# Patient Record
Sex: FEMALE | Race: WHITE | Employment: OTHER | URBAN - METROPOLITAN AREA
[De-identification: names, ages, dates, MRNs, and addresses within clinical notes are randomized per-mention and may not be internally consistent; named-entity substitution may affect disease eponyms.]

---

## 2017-05-31 ENCOUNTER — HOSPITAL ENCOUNTER (OUTPATIENT)
Dept: ULTRASOUND IMAGING | Age: 82
Discharge: HOME | End: 2017-05-31
Payer: COMMERCIAL

## 2017-05-31 DIAGNOSIS — N18.30 CKD (CHRONIC KIDNEY DISEASE) STAGE 3, GFR 30-59 ML/MIN (HCC): ICD-10-CM

## 2017-05-31 DIAGNOSIS — I10 ESSENTIAL HYPERTENSION: ICD-10-CM

## 2017-05-31 PROCEDURE — 76770 US EXAM ABDO BACK WALL COMP: CPT

## 2020-02-22 ENCOUNTER — HOSPITAL ENCOUNTER (INPATIENT)
Age: 85
LOS: 1 days | Discharge: HOME | DRG: 392 | End: 2020-02-22
Attending: EMERGENCY MEDICINE | Admitting: INTERNAL MEDICINE
Payer: COMMERCIAL

## 2020-02-22 ENCOUNTER — APPOINTMENT (OUTPATIENT)
Dept: CT IMAGING | Age: 85
DRG: 392 | End: 2020-02-22
Payer: COMMERCIAL

## 2020-02-22 VITALS
SYSTOLIC BLOOD PRESSURE: 127 MMHG | TEMPERATURE: 97.5 F | OXYGEN SATURATION: 94 % | RESPIRATION RATE: 16 BRPM | HEIGHT: 64 IN | HEART RATE: 63 BPM | DIASTOLIC BLOOD PRESSURE: 59 MMHG | WEIGHT: 158.8 LBS | BODY MASS INDEX: 27.11 KG/M2

## 2020-02-22 PROBLEM — R11.2 NAUSEA AND VOMITING IN ADULT: Status: RESOLVED | Noted: 2020-02-22 | Resolved: 2020-02-22

## 2020-02-22 PROBLEM — R11.2 NAUSEA AND VOMITING IN ADULT: Status: ACTIVE | Noted: 2020-02-22

## 2020-02-22 PROBLEM — R10.84 GENERALIZED ABDOMINAL PAIN: Status: RESOLVED | Noted: 2020-02-22 | Resolved: 2020-02-22

## 2020-02-22 PROBLEM — R00.1 BRADYCARDIA: Status: ACTIVE | Noted: 2020-02-22

## 2020-02-22 PROBLEM — R10.84 GENERALIZED ABDOMINAL PAIN: Status: ACTIVE | Noted: 2020-02-22

## 2020-02-22 PROBLEM — R55 SYNCOPE: Status: ACTIVE | Noted: 2020-02-22

## 2020-02-22 PROBLEM — R10.9 ABDOMINAL PAIN: Status: ACTIVE | Noted: 2020-02-22

## 2020-02-22 PROBLEM — R07.9 CHEST PAIN: Status: ACTIVE | Noted: 2020-02-22

## 2020-02-22 PROBLEM — I10 HYPERTENSION: Status: ACTIVE | Noted: 2020-02-22

## 2020-02-22 PROBLEM — I48.91 ATRIAL FIBRILLATION (HCC): Status: ACTIVE | Noted: 2020-02-22

## 2020-02-22 PROBLEM — N18.30 CHRONIC RENAL INSUFFICIENCY, STAGE III (MODERATE) (HCC): Status: ACTIVE | Noted: 2020-02-22

## 2020-02-22 PROBLEM — E78.00 HYPERCHOLESTEROLEMIA: Status: ACTIVE | Noted: 2020-02-22

## 2020-02-22 PROBLEM — R53.83 FATIGUE: Status: ACTIVE | Noted: 2020-02-22

## 2020-02-22 LAB
-: NORMAL
ABSOLUTE EOS #: 0.7 K/UL (ref 0–0.4)
ABSOLUTE IMMATURE GRANULOCYTE: ABNORMAL K/UL (ref 0–0.3)
ABSOLUTE LYMPH #: 2.2 K/UL (ref 1–4.8)
ABSOLUTE MONO #: 1.2 K/UL (ref 0.1–1.2)
ALBUMIN SERPL-MCNC: 3.6 G/DL (ref 3.5–5.2)
ALBUMIN/GLOBULIN RATIO: 1.1 (ref 1–2.5)
ALP BLD-CCNC: 47 U/L (ref 35–104)
ALT SERPL-CCNC: 46 U/L (ref 5–33)
ANION GAP SERPL CALCULATED.3IONS-SCNC: 12 MMOL/L (ref 9–17)
AST SERPL-CCNC: 33 U/L
BASOPHILS # BLD: 0 % (ref 0–2)
BASOPHILS ABSOLUTE: 0.1 K/UL (ref 0–0.2)
BILIRUB SERPL-MCNC: 0.2 MG/DL (ref 0.3–1.2)
BILIRUBIN URINE: NEGATIVE
BUN BLDV-MCNC: 54 MG/DL (ref 8–23)
BUN/CREAT BLD: ABNORMAL (ref 9–20)
CALCIUM SERPL-MCNC: 8.6 MG/DL (ref 8.6–10.4)
CHLORIDE BLD-SCNC: 105 MMOL/L (ref 98–107)
CO2: 24 MMOL/L (ref 20–31)
COLOR: YELLOW
COMMENT UA: NORMAL
CREAT SERPL-MCNC: 1.24 MG/DL (ref 0.5–0.9)
DIFFERENTIAL TYPE: ABNORMAL
EOSINOPHILS RELATIVE PERCENT: 4 % (ref 1–4)
GFR AFRICAN AMERICAN: 49 ML/MIN
GFR NON-AFRICAN AMERICAN: 41 ML/MIN
GFR SERPL CREATININE-BSD FRML MDRD: ABNORMAL ML/MIN/{1.73_M2}
GFR SERPL CREATININE-BSD FRML MDRD: ABNORMAL ML/MIN/{1.73_M2}
GLUCOSE BLD-MCNC: 110 MG/DL (ref 70–99)
GLUCOSE URINE: NEGATIVE
HCT VFR BLD CALC: 45.6 % (ref 36–46)
HEMOGLOBIN: 14.9 G/DL (ref 12–16)
IMMATURE GRANULOCYTES: ABNORMAL %
INR BLD: 1.3
KETONES, URINE: NEGATIVE
LACTIC ACID: 1.2 MMOL/L (ref 0.5–2.2)
LEUKOCYTE ESTERASE, URINE: NEGATIVE
LIPASE: 51 U/L (ref 13–60)
LYMPHOCYTES # BLD: 12 % (ref 24–44)
MAGNESIUM: 2.3 MG/DL (ref 1.6–2.6)
MCH RBC QN AUTO: 31 PG (ref 26–34)
MCHC RBC AUTO-ENTMCNC: 32.7 G/DL (ref 31–37)
MCV RBC AUTO: 94.7 FL (ref 80–100)
MONOCYTES # BLD: 7 % (ref 2–11)
NITRITE, URINE: NEGATIVE
NRBC AUTOMATED: ABNORMAL PER 100 WBC
PARTIAL THROMBOPLASTIN TIME: 29.9 SEC (ref 21.3–31.3)
PDW BLD-RTO: 14.3 % (ref 12.5–15.4)
PH UA: 6 (ref 5–8)
PLATELET # BLD: 234 K/UL (ref 140–450)
PLATELET ESTIMATE: ABNORMAL
PMV BLD AUTO: 8.2 FL (ref 6–12)
POTASSIUM SERPL-SCNC: 4.2 MMOL/L (ref 3.7–5.3)
PROTEIN UA: NEGATIVE
PROTHROMBIN TIME: 13.8 SEC (ref 9.4–12.6)
RBC # BLD: 4.82 M/UL (ref 4–5.2)
RBC # BLD: ABNORMAL 10*6/UL
REASON FOR REJECTION: NORMAL
SEG NEUTROPHILS: 77 % (ref 36–66)
SEGMENTED NEUTROPHILS ABSOLUTE COUNT: 14 K/UL (ref 1.8–7.7)
SODIUM BLD-SCNC: 141 MMOL/L (ref 135–144)
SPECIFIC GRAVITY UA: 1.01 (ref 1–1.03)
TOTAL PROTEIN: 7 G/DL (ref 6.4–8.3)
TROPONIN INTERP: NORMAL
TROPONIN INTERP: NORMAL
TROPONIN T: NORMAL NG/ML
TROPONIN T: NORMAL NG/ML
TROPONIN, HIGH SENSITIVITY: 8 NG/L (ref 0–14)
TROPONIN, HIGH SENSITIVITY: 9 NG/L (ref 0–14)
TURBIDITY: CLEAR
URINE HGB: NEGATIVE
UROBILINOGEN, URINE: NORMAL
WBC # BLD: 18 K/UL (ref 3.5–11)
WBC # BLD: ABNORMAL 10*3/UL
ZZ NTE CLEAN UP: ORDERED TEST: NORMAL
ZZ NTE WITH NAME CLEAN UP: SPECIMEN SOURCE: NORMAL

## 2020-02-22 PROCEDURE — 96374 THER/PROPH/DIAG INJ IV PUSH: CPT

## 2020-02-22 PROCEDURE — 6370000000 HC RX 637 (ALT 250 FOR IP): Performed by: NURSE PRACTITIONER

## 2020-02-22 PROCEDURE — 2500000003 HC RX 250 WO HCPCS: Performed by: EMERGENCY MEDICINE

## 2020-02-22 PROCEDURE — 83690 ASSAY OF LIPASE: CPT

## 2020-02-22 PROCEDURE — 2580000003 HC RX 258: Performed by: EMERGENCY MEDICINE

## 2020-02-22 PROCEDURE — 83735 ASSAY OF MAGNESIUM: CPT

## 2020-02-22 PROCEDURE — 1210000000 HC MED SURG R&B

## 2020-02-22 PROCEDURE — 81003 URINALYSIS AUTO W/O SCOPE: CPT

## 2020-02-22 PROCEDURE — 99285 EMERGENCY DEPT VISIT HI MDM: CPT

## 2020-02-22 PROCEDURE — 93005 ELECTROCARDIOGRAM TRACING: CPT | Performed by: EMERGENCY MEDICINE

## 2020-02-22 PROCEDURE — 85610 PROTHROMBIN TIME: CPT

## 2020-02-22 PROCEDURE — 85730 THROMBOPLASTIN TIME PARTIAL: CPT

## 2020-02-22 PROCEDURE — 2580000003 HC RX 258: Performed by: NURSE PRACTITIONER

## 2020-02-22 PROCEDURE — 85025 COMPLETE CBC W/AUTO DIFF WBC: CPT

## 2020-02-22 PROCEDURE — APPSS45 APP SPLIT SHARED TIME 31-45 MINUTES: Performed by: NURSE PRACTITIONER

## 2020-02-22 PROCEDURE — 84484 ASSAY OF TROPONIN QUANT: CPT

## 2020-02-22 PROCEDURE — 80053 COMPREHEN METABOLIC PANEL: CPT

## 2020-02-22 PROCEDURE — 83605 ASSAY OF LACTIC ACID: CPT

## 2020-02-22 PROCEDURE — 36415 COLL VENOUS BLD VENIPUNCTURE: CPT

## 2020-02-22 PROCEDURE — 74176 CT ABD & PELVIS W/O CONTRAST: CPT

## 2020-02-22 PROCEDURE — 99222 1ST HOSP IP/OBS MODERATE 55: CPT | Performed by: INTERNAL MEDICINE

## 2020-02-22 RX ORDER — METOPROLOL SUCCINATE 25 MG/1
25 TABLET, EXTENDED RELEASE ORAL DAILY
Qty: 30 TABLET | Refills: 0 | Status: SHIPPED | OUTPATIENT
Start: 2020-02-22 | End: 2020-03-23

## 2020-02-22 RX ORDER — VITAMIN B COMPLEX
1 CAPSULE ORAL DAILY
Status: ON HOLD | COMMUNITY
End: 2020-02-22 | Stop reason: HOSPADM

## 2020-02-22 RX ORDER — FAMOTIDINE 20 MG/1
40 TABLET, FILM COATED ORAL DAILY
Status: DISCONTINUED | OUTPATIENT
Start: 2020-02-22 | End: 2020-02-22 | Stop reason: HOSPADM

## 2020-02-22 RX ORDER — RANITIDINE 150 MG/1
150 TABLET ORAL 2 TIMES DAILY
COMMUNITY

## 2020-02-22 RX ORDER — LISINOPRIL AND HYDROCHLOROTHIAZIDE 25; 20 MG/1; MG/1
1 TABLET ORAL DAILY
Status: ON HOLD | COMMUNITY
End: 2020-02-22 | Stop reason: HOSPADM

## 2020-02-22 RX ORDER — ACETAMINOPHEN 650 MG/1
650 SUPPOSITORY RECTAL EVERY 6 HOURS PRN
Status: DISCONTINUED | OUTPATIENT
Start: 2020-02-22 | End: 2020-02-22 | Stop reason: HOSPADM

## 2020-02-22 RX ORDER — AMLODIPINE BESYLATE 5 MG/1
5 TABLET ORAL DAILY
Status: DISCONTINUED | OUTPATIENT
Start: 2020-02-22 | End: 2020-02-22

## 2020-02-22 RX ORDER — SODIUM CHLORIDE 9 MG/ML
INJECTION, SOLUTION INTRAVENOUS CONTINUOUS
Status: DISCONTINUED | OUTPATIENT
Start: 2020-02-22 | End: 2020-02-22

## 2020-02-22 RX ORDER — SODIUM CHLORIDE 0.9 % (FLUSH) 0.9 %
10 SYRINGE (ML) INJECTION EVERY 12 HOURS SCHEDULED
Status: DISCONTINUED | OUTPATIENT
Start: 2020-02-22 | End: 2020-02-22 | Stop reason: HOSPADM

## 2020-02-22 RX ORDER — FLUTICASONE PROPIONATE 50 MCG
1 SPRAY, SUSPENSION (ML) NASAL DAILY
COMMUNITY

## 2020-02-22 RX ORDER — FAMOTIDINE 10 MG
10 TABLET ORAL 2 TIMES DAILY PRN
COMMUNITY

## 2020-02-22 RX ORDER — METOPROLOL SUCCINATE 25 MG/1
25 TABLET, EXTENDED RELEASE ORAL DAILY
Status: DISCONTINUED | OUTPATIENT
Start: 2020-02-23 | End: 2020-02-22 | Stop reason: HOSPADM

## 2020-02-22 RX ORDER — FLUTICASONE PROPIONATE 50 MCG
1 SPRAY, SUSPENSION (ML) NASAL DAILY
Status: DISCONTINUED | OUTPATIENT
Start: 2020-02-22 | End: 2020-02-22 | Stop reason: HOSPADM

## 2020-02-22 RX ORDER — POTASSIUM CHLORIDE 20 MEQ/1
40 TABLET, EXTENDED RELEASE ORAL PRN
Status: DISCONTINUED | OUTPATIENT
Start: 2020-02-22 | End: 2020-02-22 | Stop reason: HOSPADM

## 2020-02-22 RX ORDER — ASPIRIN 81 MG/1
81 TABLET ORAL DAILY
Status: DISCONTINUED | OUTPATIENT
Start: 2020-02-22 | End: 2020-02-22 | Stop reason: HOSPADM

## 2020-02-22 RX ORDER — PROMETHAZINE HYDROCHLORIDE 25 MG/1
12.5 TABLET ORAL EVERY 6 HOURS PRN
Status: DISCONTINUED | OUTPATIENT
Start: 2020-02-22 | End: 2020-02-22 | Stop reason: HOSPADM

## 2020-02-22 RX ORDER — MORPHINE SULFATE 2 MG/ML
2 INJECTION, SOLUTION INTRAMUSCULAR; INTRAVENOUS
Status: DISCONTINUED | OUTPATIENT
Start: 2020-02-22 | End: 2020-02-22 | Stop reason: HOSPADM

## 2020-02-22 RX ORDER — ROSUVASTATIN CALCIUM 10 MG/1
10 TABLET, COATED ORAL NIGHTLY
Status: DISCONTINUED | OUTPATIENT
Start: 2020-02-22 | End: 2020-02-22 | Stop reason: HOSPADM

## 2020-02-22 RX ORDER — DEXTROSE, SODIUM CHLORIDE, AND POTASSIUM CHLORIDE 5; .45; .15 G/100ML; G/100ML; G/100ML
INJECTION INTRAVENOUS CONTINUOUS
Status: DISCONTINUED | OUTPATIENT
Start: 2020-02-22 | End: 2020-02-22

## 2020-02-22 RX ORDER — SODIUM CHLORIDE 0.9 % (FLUSH) 0.9 %
10 SYRINGE (ML) INJECTION PRN
Status: DISCONTINUED | OUTPATIENT
Start: 2020-02-22 | End: 2020-02-22 | Stop reason: HOSPADM

## 2020-02-22 RX ORDER — POTASSIUM CHLORIDE 7.45 MG/ML
10 INJECTION INTRAVENOUS PRN
Status: DISCONTINUED | OUTPATIENT
Start: 2020-02-22 | End: 2020-02-22 | Stop reason: HOSPADM

## 2020-02-22 RX ORDER — ACETAMINOPHEN 325 MG/1
650 TABLET ORAL EVERY 6 HOURS PRN
Status: DISCONTINUED | OUTPATIENT
Start: 2020-02-22 | End: 2020-02-22 | Stop reason: HOSPADM

## 2020-02-22 RX ORDER — METOPROLOL TARTRATE 5 MG/5ML
5 INJECTION INTRAVENOUS EVERY 4 HOURS PRN
Status: DISCONTINUED | OUTPATIENT
Start: 2020-02-22 | End: 2020-02-22 | Stop reason: HOSPADM

## 2020-02-22 RX ORDER — 0.9 % SODIUM CHLORIDE 0.9 %
500 INTRAVENOUS SOLUTION INTRAVENOUS ONCE
Status: COMPLETED | OUTPATIENT
Start: 2020-02-22 | End: 2020-02-22

## 2020-02-22 RX ORDER — METOPROLOL SUCCINATE 25 MG/1
25 TABLET, EXTENDED RELEASE ORAL 2 TIMES DAILY
Status: DISCONTINUED | OUTPATIENT
Start: 2020-02-22 | End: 2020-02-22

## 2020-02-22 RX ORDER — MORPHINE SULFATE 4 MG/ML
4 INJECTION, SOLUTION INTRAMUSCULAR; INTRAVENOUS
Status: DISCONTINUED | OUTPATIENT
Start: 2020-02-22 | End: 2020-02-22 | Stop reason: HOSPADM

## 2020-02-22 RX ORDER — LISINOPRIL 20 MG/1
20 TABLET ORAL DAILY
Qty: 30 TABLET | Refills: 0 | Status: SHIPPED | OUTPATIENT
Start: 2020-02-22 | End: 2020-03-23

## 2020-02-22 RX ORDER — MONTELUKAST SODIUM 10 MG/1
10 TABLET ORAL NIGHTLY
Status: DISCONTINUED | OUTPATIENT
Start: 2020-02-22 | End: 2020-02-22 | Stop reason: HOSPADM

## 2020-02-22 RX ORDER — ROSUVASTATIN CALCIUM 10 MG/1
10 TABLET, COATED ORAL DAILY
Status: DISCONTINUED | OUTPATIENT
Start: 2020-02-22 | End: 2020-02-22

## 2020-02-22 RX ORDER — GREEN TEA/HOODIA GORDONII 315-12.5MG
CAPSULE ORAL
COMMUNITY

## 2020-02-22 RX ORDER — LEVOTHYROXINE SODIUM 0.07 MG/1
75 TABLET ORAL DAILY
Status: DISCONTINUED | OUTPATIENT
Start: 2020-02-22 | End: 2020-02-22 | Stop reason: HOSPADM

## 2020-02-22 RX ORDER — NICOTINE 21 MG/24HR
1 PATCH, TRANSDERMAL 24 HOURS TRANSDERMAL DAILY PRN
Status: DISCONTINUED | OUTPATIENT
Start: 2020-02-22 | End: 2020-02-22 | Stop reason: HOSPADM

## 2020-02-22 RX ORDER — VITAMIN B COMPLEX
1 CAPSULE ORAL DAILY
Status: DISCONTINUED | OUTPATIENT
Start: 2020-02-22 | End: 2020-02-22

## 2020-02-22 RX ORDER — VITAMIN B COMPLEX
2000 TABLET ORAL 2 TIMES DAILY
Status: DISCONTINUED | OUTPATIENT
Start: 2020-02-22 | End: 2020-02-22 | Stop reason: HOSPADM

## 2020-02-22 RX ORDER — ONDANSETRON 2 MG/ML
4 INJECTION INTRAMUSCULAR; INTRAVENOUS EVERY 6 HOURS PRN
Status: DISCONTINUED | OUTPATIENT
Start: 2020-02-22 | End: 2020-02-22 | Stop reason: HOSPADM

## 2020-02-22 RX ORDER — MAGNESIUM SULFATE 1 G/100ML
1 INJECTION INTRAVENOUS PRN
Status: DISCONTINUED | OUTPATIENT
Start: 2020-02-22 | End: 2020-02-22 | Stop reason: HOSPADM

## 2020-02-22 RX ORDER — LISINOPRIL AND HYDROCHLOROTHIAZIDE 12.5; 1 MG/1; MG/1
2 TABLET ORAL DAILY
Status: DISCONTINUED | OUTPATIENT
Start: 2020-02-22 | End: 2020-02-22 | Stop reason: HOSPADM

## 2020-02-22 RX ORDER — ACETAMINOPHEN 325 MG/1
650 TABLET ORAL EVERY 6 HOURS PRN
Status: ON HOLD | COMMUNITY
End: 2020-02-22 | Stop reason: HOSPADM

## 2020-02-22 RX ORDER — POLYETHYLENE GLYCOL 3350 17 G/17G
17 POWDER, FOR SOLUTION ORAL DAILY PRN
Status: DISCONTINUED | OUTPATIENT
Start: 2020-02-22 | End: 2020-02-22 | Stop reason: HOSPADM

## 2020-02-22 RX ADMIN — ASPIRIN 81 MG: 81 TABLET, COATED ORAL at 13:03

## 2020-02-22 RX ADMIN — SODIUM CHLORIDE 500 ML: 9 INJECTION, SOLUTION INTRAVENOUS at 02:45

## 2020-02-22 RX ADMIN — FAMOTIDINE 20 MG: 10 INJECTION, SOLUTION INTRAVENOUS at 03:27

## 2020-02-22 RX ADMIN — VITAMIN D, TAB 1000IU (100/BT) 2000 UNITS: 25 TAB at 13:02

## 2020-02-22 RX ADMIN — SODIUM CHLORIDE: 9 INJECTION, SOLUTION INTRAVENOUS at 10:00

## 2020-02-22 RX ADMIN — Medication 10 ML: at 12:56

## 2020-02-22 ASSESSMENT — ENCOUNTER SYMPTOMS
NAUSEA: 1
EYE DISCHARGE: 1
SORE THROAT: 0
SHORTNESS OF BREATH: 0
ABDOMINAL PAIN: 1
BACK PAIN: 0
DIARRHEA: 0
ALLERGIC/IMMUNOLOGIC COMMENTS: MULTIPLE ALLERGIES
RESPIRATORY NEGATIVE: 1
BLOOD IN STOOL: 0
COUGH: 0
ABDOMINAL DISTENTION: 0
VOMITING: 1
EYE ITCHING: 0
EYE REDNESS: 0
RHINORRHEA: 0
EYE PAIN: 0

## 2020-02-22 ASSESSMENT — PAIN SCALES - GENERAL
PAINLEVEL_OUTOF10: 0

## 2020-02-22 NOTE — ED NOTES
This writer called inpatient unit to inquire regarding transfer, Mariangel Garvin to transfer Pt to room 327, awaiting arrival of [facility].      Stanly Goltz, RN  02/22/20 6264

## 2020-02-22 NOTE — ED PROVIDER NOTES
[address] [facility]  [address] [address] [facility] [address]  Phone: [phone]  Fax: [phone] [facility]          Pt Name: Iron Coleman  MRN: [id number]  [place] 1/16/1932  Date of evaluation: 2/22/2020      CHIEF COMPLAINT       Chief Complaint   Patient presents with    Nausea    Emesis       HISTORY OF PRESENT ILLNESS       Iron Coleman is a 80 y.o. female who presents with acute nausea and vomiting with mild abdominal discomfort. Awoke her from sleeping tonight. She lives alone and felt too weak to get out of the bathroom. No diarrhea. Denies any fevers. Went to bed feeling fine around 10 PM.  Symptoms woke her up. Denies any chest pain or difficulty breathing. EMS gave 4 of Zofran and started IV fluids. She states she is feeling a little bit better after the medications. No abdominal surgical history per the patient. Denies any urinary symptoms. Denies other symptoms at this time. REVIEW OF SYSTEMS       Review of Systems   Constitutional: Negative for chills, fatigue and fever. HENT: Negative for rhinorrhea and sore throat. Eyes: Negative for pain. Respiratory: Negative for cough and shortness of breath. Cardiovascular: Negative for chest pain. Gastrointestinal: Positive for nausea and vomiting. Negative for abdominal distention, blood in stool and diarrhea. Genitourinary: Negative for difficulty urinating. Musculoskeletal: Negative for back pain and neck pain. Skin: Negative for rash. Neurological: Negative for weakness and headaches. 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     SURGICAL HISTORY      has a past surgical history that includes Carotid endarterectomy (Right, 2004 or 2005); eye surgery (Bilateral); and pre-malignant / benign skin lesion excision (3/26/2013). CURRENT MEDICATIONS       Previous Medications    ACETAMINOPHEN (TYLENOL) 325 MG TABLET    Take 650 mg by mouth every 6 hours as needed for Pain    AMLODIPINE (NORVASC) 5 MG TABLET    Take 0.5 tablets by mouth daily    ASPIRIN 81 MG EC TABLET    Take 81 mg by mouth daily    B COMPLEX VITAMINS CAPSULE    Take 1 capsule by mouth daily    CHOLECALCIFEROL (VITAMIN D3) 3000 UNITS TABS    Take 2,000 Units by mouth 2 times daily    CRESTOR 10 MG TABLET    Take 10 mg by mouth daily. FLUTICASONE (FLONASE) 50 MCG/ACT NASAL SPRAY    1 spray by Each Nostril route daily    LEVOTHYROXINE (SYNTHROID) 75 MCG TABLET    Take 75 mcg by mouth Daily. LISINOPRIL-HYDROCHLOROTHIAZIDE (ZESTORETIC) 20-25 MG PER TABLET    Take 1 tablet by mouth daily    METOPROLOL (TOPROL-XL) 25 MG XL TABLET    Take 25 mg by mouth 2 times daily     MONTELUKAST (SINGULAIR) 10 MG TABLET    Take 10 mg by mouth nightly    MULTIPLE VITAMINS-MINERALS (HAIR/SKIN/NAILS/BIOTIN) TABS    Take by mouth    RANITIDINE (ZANTAC) 150 MG TABLET    Take 150 mg by mouth 2 times daily       ALLERGIES     is allergic to other; pcn [penicillins]; pollen extract; seasonal; and lidocaine. FAMILY HISTORY     She indicated that her mother is . She indicated that her father is . She indicated that only one of her four sisters is alive. She indicated that all of her three brothers are . family history includes Cancer in her sister; Cirrhosis in her mother; Heart Disease in her brother, brother, brother, father, sister, and sister; Hypertension in her mother. SOCIAL HISTORY      reports that she quit smoking about 39 years ago. Her smoking use included cigarettes. She has a 1.00 pack-year smoking history. She has never used smokeless tobacco. She reports that she does not drink alcohol or use drugs.     PHYSICAL EXAM     INITIAL VITALS:  height is 5' 4\" (1.626 m) and The patient was given the following medications:  Orders Placed This Encounter   Medications    0.9 % sodium chloride bolus    famotidine (PEPCID) injection 20 mg        Vitals:    Vitals:    02/22/20 0253   BP: (!) 169/72   Pulse: 76   Resp: 18   Temp: 98.4 °F (36.9 °C)   TempSrc: Oral   SpO2: 95%   Weight: 67.6 kg (149 lb)   Height: 5' 4\" (1.626 m)     -------------------------  BP: (!) 169/72, Temp: 98.4 °F (36.9 °C), Pulse: 76, Resp: 18      Re-evaluation Notes    Patient doing better on reevaluation's. No acute changes. Recheck abdomen is benign and nonsurgical at this time. Cardiac enzymes normal.  Still throwing intermittent PVCs. Lactic acid negative. Does have significant leukocytosis. CT scan shows no acute significant findings. Plan will be to admit the patient for further observation. I discussed this with the patient and she is agreeable. Also discussed with the hospitalist who accepted admission of the patient. May be viral.  Patient also mentions that she just finished a course of prednisone for gout which could also account for some of her leukocytosis however still may be infectious in nature. CONSULTS:    None    CRITICAL CARE:     None    PROCEDURES:    None    FINAL IMPRESSION      1. Non-intractable vomiting with nausea, unspecified vomiting type    2. Generalized abdominal pain          DISPOSITION/PLAN   DISPOSITION Decision To Admit 02/22/2020 06:16:44 AM      Condition on Disposition    Improved    PATIENT REFERRED TO:  No follow-up provider specified.     DISCHARGE MEDICATIONS:  New Prescriptions    No medications on file       (Please note that portions of this note were completed with a voice recognition program.  Efforts were made to edit the dictations but occasionally words are mis-transcribed.)    Zach Prajapati DO  Attending Emergency Physician        Zach Prajapati DO  02/22/20 9210

## 2020-02-22 NOTE — DISCHARGE SUMMARY
[address] [facility]    Discharge Summary     Patient ID: Miladis Welsh  :  1932   MRN: [id number]     ACCOUNT:  [de-identified]   Patient's PCP: Brittny Jimenez  Admit Date: 2020   Discharge Date: 2020     Length of Stay: 0  Code Status:  Full Code  Admitting Physician: Marcy Ashley, DO  Discharge Physician: RUBEN Hampton NP     Active Discharge Diagnoses:     Hospital Problem Lists:  Principal Problem (Resolved):    Nausea and vomiting in adult  Active Problems:    Chronic renal insufficiency, stage III (moderate) ([facility] [address]    Hypertension  Resolved Problems:    Generalized abdominal pain      Admission Condition:  good     Discharged Condition: good    Hospital Stay:     Hospital Course:  Miladis Welsh is a 80 y.o. female who was admitted for the management of  Nausea and vomiting in adult , presented to ER with Nausea and Emesis    She woke up at 2 AM this morning with sudden nausea and vomiting. She also had an episode of loose stool, then the nausea continued. She came to the ED for evaluation. CT scan of the abdomen was negative, WBC elevated. The patient reported she had just completed a course of steroid for a \"gout attack\" a few days prior to this event. She was given IV fluids and antiemetics, and her symptoms resolved quickly. Her ALT and AST were noted to be slightly elevated. She denied abdominal pain throughout the day. Her creatinine remained at baseline.          Significant therapeutic interventions:     IV hydration  Antiemetic      Significant Diagnostic Studies:   Labs / Micro:  CBC:   Lab Results   Component Value Date    WBC 18.0 2020    RBC 4.82 2020    HGB 14.9 2020    HCT 45.6 2020    MCV 94.7 2020    MCH 31.0 2020    MCHC 32.7 2020    RDW 14.3 2020     2020       HFP:    Lab Results   Component Value Date    PROT 7.0 2020     CMP:    Lab Results Component Value Date    GLUCOSE 110 02/22/2020     02/22/2020    K 4.2 02/22/2020     02/22/2020    CO2 24 02/22/2020    BUN 54 02/22/2020    CREATININE 1.24 02/22/2020    ANIONGAP 12 02/22/2020    ALKPHOS 47 02/22/2020    ALT 46 02/22/2020    AST 33 02/22/2020    BILITOT 0.20 02/22/2020    LABALBU 3.6 02/22/2020    ALBUMIN 1.1 02/22/2020    LABGLOM 41 02/22/2020    GFRAA 49 02/22/2020    GFR      02/22/2020    GFR NOT REPORTED 02/22/2020    PROT 7.0 02/22/2020    CALCIUM 8.6 02/22/2020     U/A:    Lab Results   Component Value Date    COLORU YELLOW 02/22/2020    TURBIDITY CLEAR 02/22/2020    SPECGRAV 1.015 02/22/2020    HGBUR NEGATIVE 02/22/2020    PHUR 6.0 02/22/2020    PROTEINU NEGATIVE 02/22/2020    GLUCOSEU NEGATIVE 02/22/2020    KETUA NEGATIVE 02/22/2020    BILIRUBINUR NEGATIVE 02/22/2020    UROBILINOGEN Normal 02/22/2020    NITRU NEGATIVE 02/22/2020    LEUKOCYTESUR NEGATIVE 02/22/2020       Radiology:  Ct Abdomen Pelvis Wo Contrast Additional Contrast? None    Result Date: 2/22/2020  No acute findings. Consultations:    Consults:     Final Specialist Recommendations/Findings:   None      The patient was seen and examined on day of discharge and this discharge summary is in conjunction with any daily progress note from day of discharge.     Discharge plan:     Disposition: Home    Physician Follow Up:     [facility]  [address] [facility]  [phone]    In 1 week  Call for a post hospitalization follow up early next week       Requiring Further Evaluation/Follow Up POST HOSPITALIZATION/Incidental Findings:   ALT/AST elevated    Monitor CBC/diff    Allergy specialist recommendations (patient has an appointment to see an allergist soon)    Diet: cardiac diet    Activity: As tolerated    Instructions to Patient:     Take medications as prescribed    Follow up with your PCP as recommended    Follow up with nephrology & allergist    Discharge Medications:      Medication List patient's care.

## 2020-02-22 NOTE — H&P
[address] [facility]    HISTORY AND PHYSICAL EXAMINATION            Date:   2/22/2020  Patient name:  Christina Park  Date of admission:  2/22/2020  2:54 AM  MRN:   [id number]  Account:  [de-identified]  YOB: 1932  PCP:    David Medina  Room:   327/327-01  Code Status:    Full Code    Chief Complaint:     Chief Complaint   Patient presents with    Nausea    Emesis       History Obtained From:     patient, electronic medical record    History of Present Illness:     Christina Park is a 80 y.o. Non-/non  female who presents with Nausea and Emesis   and is admitted to the hospital for the management of Nausea and vomiting in adult. Patient reports last night she had sudden onset of generalized abdominal pain with nausea. She got up to go to the bathroom, had 3-4 bouts of emesis, then had a loose bowel movement. She then had continued nausea. She has many food allergies and a history of asthma. She had just completed a round of prednisone for a \"gout attack\". She was brought to the ED by EMS after activating her Life Alert. EMS gave her IV fluids and Zofran. While in the ED, EKG was completed and revealed sinus rhythm w PVCs and no signs of MI or ischemia. CT of the abdomen was completed and was negative. Her creat is close to her baseline- she has CKD and sees Dr Janet Goncalves as an outpatient. Her WBC was 18 and her eosinophils were noted to 0.70. She does not recall eating anything she's allergic/sensitive to. She was admitted for further observation and management. Today she is feeling much better. She has had no further nausea or vomiting since admission, and her diet has been advanced. Her BP is borderline today, though, and her BP meds have been held throughout the shift. She is able to ambulate well with a walker, and she says she uses both a walker and a cane in her home.  She is tolerating the advancement of her diet well oz (72 kg)   SpO2 94%   BMI 27.26 kg/m²   Temp (24hrs), Av.9 °F (36.6 °C), Min:97.5 °F (36.4 °C), Max:98.4 °F (36.9 °C)        Intake/Output Summary (Last 24 hours) at 2020 1532  Last data filed at 2020 1235  Gross per 24 hour   Intake 468 ml   Output --   Net 468 ml       Physical Exam  Constitutional:       General: She is not in acute distress. Appearance: Normal appearance. She is not ill-appearing. HENT:      Head: Normocephalic and atraumatic. Right Ear: External ear normal.      Left Ear: External ear normal.      Nose: Nose normal. No congestion or rhinorrhea. Mouth/Throat:      Mouth: Mucous membranes are moist.   Eyes:      General: No scleral icterus. Right eye: No discharge. Left eye: No discharge. Extraocular Movements: Extraocular movements intact. Conjunctiva/sclera: Conjunctivae normal.      Pupils: Pupils are equal, round, and reactive to light. Neck:      Musculoskeletal: Normal range of motion and neck supple. Comments: No JVD  Cardiovascular:      Rate and Rhythm: Normal rate. Rhythm irregular. Pulses: Normal pulses. Heart sounds: Normal heart sounds. No murmur. No friction rub. No gallop. Pulmonary:      Effort: Pulmonary effort is normal. No respiratory distress. Breath sounds: Normal breath sounds. Abdominal:      General: There is no distension. Palpations: Abdomen is soft. There is no mass. Tenderness: There is no abdominal tenderness. There is no guarding. Comments: Bowel sounds hypoactive   Musculoskeletal: Normal range of motion. Right lower leg: No edema. Left lower leg: No edema. Skin:     General: Skin is warm and dry. Coloration: Skin is pale. Findings: No bruising. Neurological:      General: No focal deficit present. Mental Status: She is alert and oriented to person, place, and time.    Psychiatric:         Mood and Affect: Mood normal.         Behavior: 33 (H) <32 U/L    Total Bilirubin 0.20 (L) 0.3 - 1.2 mg/dL    Total Protein 7.0 6.4 - 8.3 g/dL    Alb 3.6 3.5 - 5.2 g/dL    Albumin/Globulin Ratio 1.1 1.0 - 2.5    GFR Non- 41 (L) >60 mL/min    GFR  49 (L) >60 mL/min    GFR Comment          GFR Staging NOT REPORTED    Troponin    Collection Time: 02/22/20  3:05 AM   Result Value Ref Range    Troponin, High Sensitivity 9 0 - 14 ng/L    Troponin T NOT REPORTED <0.03 ng/mL    Troponin Interp NOT REPORTED    Lactic Acid    Collection Time: 02/22/20  3:05 AM   Result Value Ref Range    Lactic Acid 1.2 0.5 - 2.2 mmol/L   Lipase    Collection Time: 02/22/20  3:05 AM   Result Value Ref Range    Lipase 51 13 - 60 U/L   Magnesium    Collection Time: 02/22/20  3:05 AM   Result Value Ref Range    Magnesium 2.3 1.6 - 2.6 mg/dL   SPECIMEN REJECTION    Collection Time: 02/22/20  3:05 AM   Result Value Ref Range    Specimen Source . BLOOD     Ordered Test PT PTT     Reason for Rejection Unable to perform testing: No specimen received.      - NOT REPORTED    Protime-INR    Collection Time: 02/22/20  3:24 AM   Result Value Ref Range    Protime 13.8 (H) 9.4 - 12.6 sec    INR 1.3    APTT    Collection Time: 02/22/20  3:24 AM   Result Value Ref Range    PTT 29.9 21.3 - 31.3 sec   Troponin    Collection Time: 02/22/20  5:06 AM   Result Value Ref Range    Troponin, High Sensitivity 8 0 - 14 ng/L    Troponin T NOT REPORTED <0.03 ng/mL    Troponin Interp NOT REPORTED    Urinalysis Reflex to Culture    Collection Time: 02/22/20  6:05 AM   Result Value Ref Range    Color, UA YELLOW YELLOW    Turbidity UA CLEAR CLEAR    Glucose, Ur NEGATIVE NEGATIVE    Bilirubin Urine NEGATIVE NEGATIVE    Ketones, Urine NEGATIVE NEGATIVE    Specific Gravity, UA 1.015 1.005 - 1.030    Urine Hgb NEGATIVE NEGATIVE    pH, UA 6.0 5.0 - 8.0    Protein, UA NEGATIVE NEGATIVE    Urobilinogen, Urine Normal Normal    Nitrite, Urine NEGATIVE NEGATIVE    Leukocyte Esterase, Urine NEGATIVE NEGATIVE    Urinalysis Comments       Microscopic exam not performed based on chemical results unless requested in original order. Urinalysis Comments          Urinalysis Comments       Utilizing a urinalysis as the only screening method to exclude a potential uropathogen can be unreliable in many patient populations. Rapid screening tests are less sensitive than culture and if UTI is a clinical possibility, culture should be considered despite a negative urinalysis. Imaging/Diagnostics:    Ct Abdomen Pelvis Wo Contrast Additional Contrast? None    Result Date: 2/22/2020  No acute findings. Assessment :      Hospital Problems           Last Modified POA    * (Principal) Nausea and vomiting in adult 2/22/2020 Yes    Chronic renal insufficiency, stage III (moderate) ([facility] [address] 2/22/2020 Yes    Hypertension 2/22/2020 Yes    Generalized abdominal pain 2/22/2020 Yes          Plan:     Patient status inpatient in the Med/Surge    1. Monitor for further nausea/vomiting- Zofran prn  2. Advance diet as tolerated- stop IVF when taking PO well  3. Creat at baseline- avoid nephrotoxic agents  4. Monitor BP- hold HCTZ/lisinopril. Consider changing toprol to daily, discontinue or decrease Norvasc  5. Follow up with Dr Griffin Denney as outpatient  6. Abdominal pain resolved- cont to monitor  7. GI prophylaxis  8. No Lovenox 2/2 renal function  9. Ambulate  10. Send stool for culture    Anticipate discharge home this evening if medically stable with follow up with PCP early next week. Consultations:   None     Patient is admitted as inpatient status because of co-morbidities listed above, severity of signs and symptoms as outlined, requirement for current medical therapies and most importantly because of direct risk to patient if care not provided in a hospital setting.     RUBEN Ortiz NP  2/22/2020  3:32 PM    Copy sent to Dr. Shayy Bobo

## 2020-02-23 LAB
EKG ATRIAL RATE: 73 BPM
EKG P AXIS: 89 DEGREES
EKG P-R INTERVAL: 222 MS
EKG Q-T INTERVAL: 408 MS
EKG QRS DURATION: 78 MS
EKG QTC CALCULATION (BAZETT): 449 MS
EKG T AXIS: 26 DEGREES
EKG VENTRICULAR RATE: 73 BPM

## 2020-02-23 NOTE — PLAN OF CARE
Problem: Falls - Risk of:  Goal: Will remain free from falls  Description  Will remain free from falls  Outcome: Ongoing  Patient is a fall risk during this admission. Fall risk assessment was performed. Patient is absent of falls. Bed is in the lowest position. Wheels on the bed are locked. Call light and bed side table are within reach. Clutter is removed. Patient was educated to call out when needing assistance or wanting to get out of bed. Patient offered toileting assistance during rounding. Hourly rounds have been performed. Goal: Absence of physical injury  Description  Absence of physical injury  Outcome: Ongoing     Problem: Risk for Impaired Skin Integrity  Goal: Tissue integrity - skin and mucous membranes  Description  Structural intactness and normal physiological function of skin and  mucous membranes.   Outcome: Ongoing     Problem: Infection:  Goal: Will remain free from infection  Description  Will remain free from infection  Outcome: Ongoing  No signs of infection      Problem: Safety:  Goal: Free from accidental physical injury  Description  Free from accidental physical injury  Outcome: Ongoing  Goal: Free from intentional harm  Description  Free from intentional harm  Outcome: Ongoing     Problem: Daily Care:  Goal: Daily care needs are met  Description  Daily care needs are met  Outcome: Ongoing     Problem: Pain:  Goal: Patient's pain/discomfort is manageable  Description  Patient's pain/discomfort is manageable  Outcome: Ongoing  No complaints of pain      Problem: Discharge Planning:  Goal: Patients continuum of care needs are met  Description  Patients continuum of care needs are met  Outcome: Ongoing     Problem: Nausea/Vomiting:  Goal: Absence of nausea/vomiting  Description  Absence of nausea/vomiting  Outcome: Ongoing  No nausea or vomiting this shift     Goal: Able to drink  Description  Able to drink  Outcome: Ongoing  Able to tolerate PO intake   Goal: Able to eat  Description  Able to eat  Outcome: Ongoing  Goal: Ability to achieve adequate nutritional intake will improve  Description  Ability to achieve adequate nutritional intake will improve  Outcome: Ongoing

## 2020-03-15 ENCOUNTER — HOSPITAL ENCOUNTER (EMERGENCY)
Age: 85
Discharge: HOME | End: 2020-03-15
Attending: EMERGENCY MEDICINE
Payer: COMMERCIAL

## 2020-03-15 VITALS
HEIGHT: 64 IN | SYSTOLIC BLOOD PRESSURE: 156 MMHG | OXYGEN SATURATION: 93 % | TEMPERATURE: 97.9 F | WEIGHT: 150 LBS | HEART RATE: 69 BPM | BODY MASS INDEX: 25.61 KG/M2 | RESPIRATION RATE: 18 BRPM | DIASTOLIC BLOOD PRESSURE: 84 MMHG

## 2020-03-15 LAB
ABSOLUTE EOS #: 0.2 K/UL (ref 0–0.4)
ABSOLUTE IMMATURE GRANULOCYTE: ABNORMAL K/UL (ref 0–0.3)
ABSOLUTE LYMPH #: 0.95 K/UL (ref 1–4.8)
ABSOLUTE MONO #: 0.61 K/UL (ref 0.1–1.2)
ALBUMIN SERPL-MCNC: 4.2 G/DL (ref 3.5–5.2)
ALBUMIN/GLOBULIN RATIO: 1.1 (ref 1–2.5)
ALP BLD-CCNC: 56 U/L (ref 35–104)
ALT SERPL-CCNC: 14 U/L (ref 5–33)
AMYLASE: 53 U/L (ref 28–100)
ANION GAP SERPL CALCULATED.3IONS-SCNC: 13 MMOL/L (ref 9–17)
AST SERPL-CCNC: 26 U/L
BASOPHILS # BLD: 0 % (ref 0–2)
BASOPHILS ABSOLUTE: 0.01 K/UL (ref 0–0.2)
BILIRUB SERPL-MCNC: 0.24 MG/DL (ref 0.3–1.2)
BILIRUBIN URINE: NEGATIVE
BUN BLDV-MCNC: 24 MG/DL (ref 8–23)
BUN/CREAT BLD: ABNORMAL (ref 9–20)
CALCIUM SERPL-MCNC: 9.2 MG/DL (ref 8.6–10.4)
CHLORIDE BLD-SCNC: 100 MMOL/L (ref 98–107)
CO2: 23 MMOL/L (ref 20–31)
COLOR: YELLOW
COMMENT UA: NORMAL
CREAT SERPL-MCNC: 1.19 MG/DL (ref 0.5–0.9)
DIFFERENTIAL TYPE: ABNORMAL
EOSINOPHILS RELATIVE PERCENT: 2 % (ref 1–4)
GFR AFRICAN AMERICAN: 52 ML/MIN
GFR NON-AFRICAN AMERICAN: 43 ML/MIN
GFR SERPL CREATININE-BSD FRML MDRD: ABNORMAL ML/MIN/{1.73_M2}
GFR SERPL CREATININE-BSD FRML MDRD: ABNORMAL ML/MIN/{1.73_M2}
GLUCOSE BLD-MCNC: 142 MG/DL (ref 70–99)
GLUCOSE URINE: NEGATIVE
HCT VFR BLD CALC: 44.3 % (ref 36–46)
HEMOGLOBIN: 14.7 G/DL (ref 12–16)
IMMATURE GRANULOCYTES: ABNORMAL %
KETONES, URINE: NEGATIVE
LEUKOCYTE ESTERASE, URINE: NEGATIVE
LIPASE: 32 U/L (ref 13–60)
LYMPHOCYTES # BLD: 8 % (ref 24–44)
MAGNESIUM: 2.1 MG/DL (ref 1.6–2.6)
MCH RBC QN AUTO: 31.7 PG (ref 26–34)
MCHC RBC AUTO-ENTMCNC: 33.2 G/DL (ref 31–37)
MCV RBC AUTO: 95.7 FL (ref 80–100)
MONOCYTES # BLD: 5 % (ref 2–11)
NITRITE, URINE: NEGATIVE
NRBC AUTOMATED: ABNORMAL PER 100 WBC
PDW BLD-RTO: 14.4 % (ref 12.5–15.4)
PH UA: 6 (ref 5–8)
PLATELET # BLD: 245 K/UL (ref 140–450)
PLATELET ESTIMATE: ABNORMAL
PMV BLD AUTO: 9.4 FL (ref 8–14)
POTASSIUM SERPL-SCNC: 3.4 MMOL/L (ref 3.7–5.3)
PROTEIN UA: NEGATIVE
RBC # BLD: 4.63 M/UL (ref 4–5.2)
RBC # BLD: ABNORMAL 10*6/UL
SEG NEUTROPHILS: 85 % (ref 36–66)
SEGMENTED NEUTROPHILS ABSOLUTE COUNT: 10.53 K/UL (ref 1.8–7.7)
SODIUM BLD-SCNC: 136 MMOL/L (ref 135–144)
SPECIFIC GRAVITY UA: 1.02 (ref 1–1.03)
TOTAL PROTEIN: 7.9 G/DL (ref 6.4–8.3)
TURBIDITY: CLEAR
URINE HGB: NEGATIVE
UROBILINOGEN, URINE: NORMAL
WBC # BLD: 12.3 K/UL (ref 3.5–11)
WBC # BLD: ABNORMAL 10*3/UL

## 2020-03-15 PROCEDURE — 80053 COMPREHEN METABOLIC PANEL: CPT

## 2020-03-15 PROCEDURE — 36415 COLL VENOUS BLD VENIPUNCTURE: CPT

## 2020-03-15 PROCEDURE — 51701 INSERT BLADDER CATHETER: CPT

## 2020-03-15 PROCEDURE — 81003 URINALYSIS AUTO W/O SCOPE: CPT

## 2020-03-15 PROCEDURE — 83690 ASSAY OF LIPASE: CPT

## 2020-03-15 PROCEDURE — 83735 ASSAY OF MAGNESIUM: CPT

## 2020-03-15 PROCEDURE — 82150 ASSAY OF AMYLASE: CPT

## 2020-03-15 PROCEDURE — 85025 COMPLETE CBC W/AUTO DIFF WBC: CPT

## 2020-03-15 PROCEDURE — 99284 EMERGENCY DEPT VISIT MOD MDM: CPT

## 2020-03-15 RX ORDER — PROMETHAZINE HYDROCHLORIDE 25 MG/1
25 TABLET ORAL EVERY 6 HOURS PRN
Qty: 20 TABLET | Refills: 0 | Status: SHIPPED | OUTPATIENT
Start: 2020-03-15 | End: 2020-03-22

## 2020-03-15 ASSESSMENT — ENCOUNTER SYMPTOMS
EYE PAIN: 0
STRIDOR: 0
NAUSEA: 1
ABDOMINAL PAIN: 0
SORE THROAT: 0
EYE REDNESS: 0
EYE DISCHARGE: 0
DIARRHEA: 1
COUGH: 0
WHEEZING: 0
VOMITING: 1
CONSTIPATION: 0
COLOR CHANGE: 0
SHORTNESS OF BREATH: 0

## 2020-03-15 NOTE — ED NOTES
Pt straight cathed to obtain urine specimen. Pt tolerated well.       Fabienne Kearney, RN  03/15/20 [id number]

## 2020-03-15 NOTE — ED NOTES
Female patient to ED via squad for vomiting and diarrhea that started at 2030 tonight, relates to being on toilet and felt too weak to get up so she called the squad, squad started IV and gave 400 ml bolus prior to arrival, and also gave Phenergan in route, pt relates vomiting and nausea is better, pt did not take any analgesics prior to arrival, resp even, no distress noted, skin pale warm and dry, patient is calm and cooperative, here for evaluation, pt relates was admitted 2 weeks prior with same symptoms      Quang Patricio, MICHEL  03/15/20 0159

## 2020-03-15 NOTE — ED NOTES
Assisted with dressing for discharge, to bathroom, gait steady, assisted to w/c for discharge, pt had cell phone and keys on discharge     Juan Manuel Xie RN  03/15/20 4593

## 2020-03-15 NOTE — ED NOTES
incont of green watery diarrhea, smear noted on pad, pt cleansed, warm blanket given,     Rosy Vega, RN  03/15/20 3021

## 2020-03-15 NOTE — ED NOTES
Phoned pts son Yen Rod, left another message. He immediately returned phone call and states that he will be here to get his mother in approx 45 min.        Thu Lau RN  03/15/20 9778

## 2020-03-15 NOTE — ED NOTES
Attempted to call son for ride home for discharge, pt assisted to bathroom, gait steady,     Leonardo Polanco RN  03/15/20 6342

## 2020-03-15 NOTE — ED PROVIDER NOTES
[address] [facility] ED  [address] [facility] [address] [facility] [address]  Phone: [phone]  Fax: [phone]        Pt Name: Gabriel Arce  MRN: [id number]  [place] 1/16/1932  Date of evaluation: 3/15/20      CHIEF COMPLAINT     Chief Complaint   Patient presents with    Emesis    Diarrhea         HISTORY OF PRESENT ILLNESS  (Location/Symptom, Timing/Onset, Context/Setting, Quality, Duration, Modifying Factors, Severity.)    Gabriel Arce is a 80 y.o. female who presents complaining of vomiting and diarrhea. She relates that she was recently here for the same and admitted. She relates that her belly does not really hurt. She doesn't have any fever or chills. She is not having any trouble with urination other than what she normally has with that being some frequent urination. Nothing seems to be worse but she did have some Phenergan in route via EMS and that seemed to help quite a bit. REVIEW OF SYSTEMS    (2-9 systems for level 4, 10 or more for level 5)     Review of Systems   Constitutional: Negative for activity change, appetite change, chills, fatigue and fever. HENT: Negative for congestion, ear pain and sore throat. Eyes: Negative for pain, discharge and redness. Respiratory: Negative for cough, shortness of breath, wheezing and stridor. Cardiovascular: Negative for chest pain. Gastrointestinal: Positive for diarrhea, nausea and vomiting. Negative for abdominal pain and constipation. Genitourinary: Negative for decreased urine volume and difficulty urinating. Musculoskeletal: Negative for arthralgias and myalgias. Skin: Negative for color change and rash. Neurological: Negative for dizziness, weakness and headaches. Psychiatric/Behavioral: Negative for behavioral problems and confusion.        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 SURGICAL HISTORY      has a past surgical history that includes Carotid endarterectomy (Right,  or ); eye surgery (Bilateral); and pre-malignant / benign skin lesion excision (3/26/2013). CURRENTMEDICATIONS       Previous Medications    CHOLECALCIFEROL (VITAMIN D3) 3000 UNITS TABS    Take 2,000 Units by mouth 2 times daily    CRESTOR 10 MG TABLET    Take 10 mg by mouth daily. FAMOTIDINE (PEPCID) 10 MG TABLET    Take 10 mg by mouth 2 times daily as needed    FLUTICASONE (FLONASE) 50 MCG/ACT NASAL SPRAY    1 spray by Each Nostril route daily    LEVOTHYROXINE (SYNTHROID) 75 MCG TABLET    Take 75 mcg by mouth Daily. LISINOPRIL (PRINIVIL;ZESTRIL) 20 MG TABLET    Take 1 tablet by mouth daily    METOPROLOL SUCCINATE (TOPROL XL) 25 MG EXTENDED RELEASE TABLET    Take 1 tablet by mouth daily    MONTELUKAST (SINGULAIR) 10 MG TABLET    Take 10 mg by mouth nightly    MULTIPLE VITAMINS-MINERALS (HAIR/SKIN/NAILS/BIOTIN) TABS    Take by mouth    RANITIDINE (ZANTAC) 150 MG TABLET    Take 150 mg by mouth 2 times daily       ALLERGIES     is allergic to latex; banana; eggs or egg-derived products; other; peanut-containing drug products; penicillins; pollen extract; seasonal; and lidocaine. FAMILY HISTORY     She indicated that her mother is . She indicated that her father is . She indicated that only one of her four sisters is alive. She indicated that all of her three brothers are . family history includes Cancer in her sister; Cirrhosis in her mother; Heart Disease in her brother, brother, brother, father, sister, and sister; Hypertension in her mother. SOCIAL HISTORY      reports that she quit smoking about 39 years ago. Her smoking use included cigarettes. She has a 1.00 pack-year smoking history.  She has never used smokeless tobacco. She reports that Result Value Ref Range    Glucose 142 (H) 70 - 99 mg/dL    BUN 24 (H) 8 - 23 mg/dL    CREATININE 1.19 (H) 0.50 - 0.90 mg/dL    Bun/Cre Ratio NOT REPORTED 9 - 20    Calcium 9.2 8.6 - 10.4 mg/dL    Sodium 136 135 - 144 mmol/L    Potassium 3.4 (L) 3.7 - 5.3 mmol/L    Chloride 100 98 - 107 mmol/L    CO2 23 20 - 31 mmol/L    Anion Gap 13 9 - 17 mmol/L    Alkaline Phosphatase 56 35 - 104 U/L    ALT 14 5 - 33 U/L    AST 26 <32 U/L    Total Bilirubin 0.24 (L) 0.3 - 1.2 mg/dL    Total Protein 7.9 6.4 - 8.3 g/dL    Alb 4.2 3.5 - 5.2 g/dL    Albumin/Globulin Ratio 1.1 1.0 - 2.5    GFR Non-African American 43 (L) >60 mL/min    GFR  52 (L) >60 mL/min    GFR Comment          GFR Staging NOT REPORTED    Lipase   Result Value Ref Range    Lipase 32 13 - 60 U/L   Amylase   Result Value Ref Range    Amylase 53 28 - 100 U/L   Urinalysis Reflex to Culture   Result Value Ref Range    Color, UA YELLOW YELLOW    Turbidity UA CLEAR CLEAR    Glucose, Ur NEGATIVE NEGATIVE    Bilirubin Urine NEGATIVE NEGATIVE    Ketones, Urine NEGATIVE NEGATIVE    Specific Gravity, UA 1.020 1.005 - 1.030    Urine Hgb NEGATIVE NEGATIVE    pH, UA 6.0 5.0 - 8.0    Protein, UA NEGATIVE NEGATIVE    Urobilinogen, Urine Normal Normal    Nitrite, Urine NEGATIVE NEGATIVE    Leukocyte Esterase, Urine NEGATIVE NEGATIVE    Urinalysis Comments       Microscopic exam not performed based on chemical results unless requested in original order. Urinalysis Comments          Urinalysis Comments       Utilizing a urinalysis as the only screening method to exclude a potential uropathogen can be unreliable in many patient populations. Rapid screening tests are less sensitive than culture and if UTI is a clinical possibility, culture should be considered despite a negative urinalysis. Magnesium   Result Value Ref Range    Magnesium 2.1 1.6 - 2.6 mg/dL       Labs have been reviewed.     EMERGENCY DEPARTMENT COURSE:   Vitals:    Vitals:    03/15/20 0045

## 2020-03-15 NOTE — ED NOTES
Attempted to phone pts son, Juan F de leon. Left message of  with phone number and address.        Reagan Su RN  03/15/20 2233

## 2020-03-15 NOTE — ED NOTES
No change, continues to rest     Deepak Brothers, [facility]  03/15/20 1338

## 2021-07-13 ENCOUNTER — HOSPITAL ENCOUNTER (EMERGENCY)
Age: 86
Discharge: HOME | End: 2021-07-13
Attending: EMERGENCY MEDICINE
Payer: MEDICARE

## 2021-07-13 VITALS
RESPIRATION RATE: 14 BRPM | WEIGHT: 143 LBS | SYSTOLIC BLOOD PRESSURE: 167 MMHG | HEART RATE: 66 BPM | BODY MASS INDEX: 24.41 KG/M2 | HEIGHT: 64 IN | OXYGEN SATURATION: 96 % | TEMPERATURE: 98 F | DIASTOLIC BLOOD PRESSURE: 83 MMHG

## 2021-07-13 DIAGNOSIS — L89.309 PRESSURE INJURY OF SKIN OF BUTTOCK, UNSPECIFIED INJURY STAGE, UNSPECIFIED LATERALITY: Primary | ICD-10-CM

## 2021-07-13 PROCEDURE — 99282 EMERGENCY DEPT VISIT SF MDM: CPT

## 2021-07-13 PROCEDURE — 6370000000 HC RX 637 (ALT 250 FOR IP): Performed by: EMERGENCY MEDICINE

## 2021-07-13 RX ORDER — CEPHALEXIN 250 MG/1
500 CAPSULE ORAL ONCE
Status: COMPLETED | OUTPATIENT
Start: 2021-07-13 | End: 2021-07-13

## 2021-07-13 RX ORDER — CEPHALEXIN 500 MG/1
500 CAPSULE ORAL 2 TIMES DAILY
Qty: 14 CAPSULE | Refills: 0 | Status: SHIPPED | OUTPATIENT
Start: 2021-07-13 | End: 2021-07-20

## 2021-07-13 RX ADMIN — CEPHALEXIN 500 MG: 250 CAPSULE ORAL at 21:19

## 2021-07-13 ASSESSMENT — ENCOUNTER SYMPTOMS
VOMITING: 0
EYE PAIN: 0
DIARRHEA: 0
BACK PAIN: 0
SORE THROAT: 0
NAUSEA: 0
COUGH: 0
SHORTNESS OF BREATH: 0
ABDOMINAL PAIN: 0
RHINORRHEA: 0

## 2021-07-13 ASSESSMENT — PAIN DESCRIPTION - PAIN TYPE: TYPE: ACUTE PAIN

## 2021-07-13 ASSESSMENT — PAIN SCALES - GENERAL: PAINLEVEL_OUTOF10: 7

## 2021-07-13 ASSESSMENT — PAIN DESCRIPTION - LOCATION: LOCATION: BACK

## 2021-07-14 NOTE — ED NOTES
Patient provided with discharge instructions, prescriptions, and follow up information. Verbalized understanding. No IV access to discontinue. A&OX3. Wheelchair provided for discharge.        Annamarie Melo RN  07/13/21 6737

## 2021-07-14 NOTE — ED PROVIDER NOTES
[address] [facility] ED  [address] [address] [facility] [address]  Phone: [phone]  Fax: [phone] [facility]          Pt Name: Kallie Nolen  MRN: [id number]  [place] 1/16/1932  Date of evaluation: 7/13/2021      CHIEF COMPLAINT       Chief Complaint   Patient presents with    Rash     states that about 3 weeks ago started having rash to lower back and to tailbone, taking SSD topical medication but was not renewed and worsened       HISTORY OF PRESENT ILLNESS       Kallie Nolen is a 80 y.o. female who presents with what seems to be pressure ulcer to her upper gluteal crease. Was seen by another practitioner and given Silvadene cream for it. Is improved minimally and the patient wanted another evaluation. Occurred about 2 weeks ago. She reports it burns somewhat. Not significantly pruritic. No fevers. Denies other symptoms or concerns    REVIEW OF SYSTEMS       Review of Systems   Constitutional: Negative for chills, fatigue and fever. HENT: Negative for rhinorrhea and sore throat. Eyes: Negative for pain. Respiratory: Negative for cough and shortness of breath. Cardiovascular: Negative for chest pain. Gastrointestinal: Negative for abdominal pain, diarrhea, nausea and vomiting. Genitourinary: Negative for difficulty urinating. Musculoskeletal: Negative for back pain and neck pain. Skin: Positive for rash and wound. Neurological: Negative for weakness and headaches. 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     SURGICAL HISTORY      has a past surgical history that includes Carotid endarterectomy (Right, 2004 or 2005); eye surgery (Bilateral); and pre-malignant / benign skin lesion excision (3/26/2013). CURRENT MEDICATIONS       Previous Medications    CHOLECALCIFEROL (VITAMIN D3) 3000 UNITS TABS    Take 2,000 Units by mouth 2 times daily    CRESTOR 10 MG TABLET    Take 10 mg by mouth daily. FAMOTIDINE (PEPCID) 10 MG TABLET    Take 10 mg by mouth 2 times daily as needed    FLUTICASONE (FLONASE) 50 MCG/ACT NASAL SPRAY    1 spray by Each Nostril route daily    LEVOTHYROXINE (SYNTHROID) 75 MCG TABLET    Take 75 mcg by mouth Daily. LISINOPRIL (PRINIVIL;ZESTRIL) 20 MG TABLET    Take 1 tablet by mouth daily    METOPROLOL SUCCINATE (TOPROL XL) 25 MG EXTENDED RELEASE TABLET    Take 1 tablet by mouth daily    MONTELUKAST (SINGULAIR) 10 MG TABLET    Take 10 mg by mouth nightly    MULTIPLE VITAMINS-MINERALS (HAIR/SKIN/NAILS/BIOTIN) TABS    Take by mouth    RANITIDINE (ZANTAC) 150 MG TABLET    Take 150 mg by mouth 2 times daily    SILVER SULFADIAZINE (SILVADENE) 1 % CREAM    Apply topically daily Apply topically daily. ALLERGIES     is allergic to latex, banana, eggs or egg-derived products, other, peanut-containing drug products, penicillins, pollen extract, seasonal, and lidocaine. FAMILY HISTORY     She indicated that her mother is . She indicated that her father is . She indicated that only one of her four sisters is alive. She indicated that all of her three brothers are . family history includes Cancer in her sister; Cirrhosis in her mother; Heart Disease in her brother, brother, brother, father, sister, and sister; Hypertension in her mother. SOCIAL HISTORY      reports that she quit smoking about 40 years ago. Her smoking use included cigarettes. She has a 1.00 pack-year smoking history. She has never used smokeless tobacco. She reports that she does not drink alcohol and does not use drugs. PHYSICAL EXAM     INITIAL VITALS:  height is 5' 4\" (1.626 m) and weight is 64.9 kg (143 lb). Her oral temperature is 98 °F (36.7 °C).  Her blood pressure is 180/77 (abnormal) and her pulse is 66. Her respiration is 14 and oxygen saturation is 96%. Physical Exam  Vitals reviewed. Constitutional:       General: She is not in acute distress. Appearance: She is well-developed. She is not ill-appearing or toxic-appearing. HENT:      Head: Normocephalic and atraumatic. Right Ear: External ear normal.      Left Ear: External ear normal.   Eyes:      General: Lids are normal.   Neck:      Trachea: No tracheal deviation. Cardiovascular:      Rate and Rhythm: Normal rate and regular rhythm. Pulmonary:      Effort: Pulmonary effort is normal. No respiratory distress. Breath sounds: Normal breath sounds. Abdominal:      Palpations: Abdomen is soft. Tenderness: There is no abdominal tenderness. Musculoskeletal:        Legs:       Comments: Pressure ulcer appearance with some surrounding erythema worse on the right but both sides of the superior gluteal crease. No significant tenderness. No fluctuance or crepitus. No discharge. Otherwise unremarkable exam.   Skin:     General: Skin is warm and dry. Neurological:      Mental Status: She is alert. GCS: GCS eye subscore is 4. GCS verbal subscore is 5. GCS motor subscore is 6. Psychiatric:         Speech: Speech normal.           DIFFERENTIAL DIAGNOSIS/ MDM:     Plan to be to discharge patient home. I will put her on cephalexin for 7 days to make sure there is no developing infection due to the one area that is slightly open on the right superior gluteal region. Advised to follow-up with her PCP. Advised return sooner if worsening or for new or concerning symptoms. She is comfortable with the plan. There are no signs of infection, there are no foreign bodies within the wound, there are no signs of compartment syndrome. The pulses are 2/4. The motor is 5/5. The sensation is intact. The patient has full range of motion.   The patient was instructed to follow up in 2-3 days with their family doctor for a wound check. We also discussed returning to the Emergency Department immediately if new or worsening symptoms occur. We have discussed the symptoms which are most concerning (e.g., increasing pain, fever, drainage from the wound or other signs of infection, numbness, weakness, color change or coldness to the extremity) that necessitate immediate return. The patient was advised to keep the wound clean and dry, they may apply antibiotic ointment to the wound. The patient understands that at this time there is no evidence for a more malignant underlying process, but the patient also understands that early in the process of an illness or injury, an emergency department workup can be falsely reassuring. Routine discharge counseling was given, and the patient understands that worsening, changing or persistent symptoms should prompt an immediate call or follow up with their primary physician or return to the emergency department. The importance of appropriate follow up was also discussed. I have reviewed the disposition diagnosis with the patient and or their family/guardian. I have answered their questions and given discharge instructions. They voiced understanding of these instructions and did not have any further questions or complaints. DIAGNOSTIC RESULTS     EKG: All EKG's are interpreted by the Emergency Department Physician who either signs or Co-signs this chart in the absence of a cardiologist.        RADIOLOGY:   Interpretation per the Radiologist below, if available at the time of this note:  No orders to display       No results found. LABS:  No results found for this visit on 07/13/21.     EMERGENCY DEPARTMENT COURSE:     The patient was given the following medications:  Orders Placed This Encounter   Medications    cephALEXin (KEFLEX) capsule 500 mg     Order Specific Question:   Antimicrobial Indications     Answer:   Skin and Soft Tissue Infection    cephALEXin (KEFLEX) 500 MG capsule     Sig: Take 1 capsule by mouth 2 times daily for 7 days     Dispense:  14 capsule     Refill:  0        Vitals:    Vitals:    07/13/21 2056   BP: (!) 180/77   Pulse: 66   Resp: 14   Temp: 98 °F (36.7 °C)   TempSrc: Oral   SpO2: 96%   Weight: 64.9 kg (143 lb)   Height: 5' 4\" (1.626 m)     -------------------------  BP: (!) 180/77, Temp: 98 °F (36.7 °C), Pulse: 66, Resp: 14    CONSULTS:    None    CRITICAL CARE:     None    PROCEDURES:    None    FINAL IMPRESSION      1.  Pressure injury of skin of buttock, unspecified injury stage, unspecified laterality          DISPOSITION/PLAN   DISPOSITION Decision To Discharge 07/13/2021 09:10:58 PM      Condition on Disposition    Improved    PATIENT REFERRED TO:  Pawel Carrizales MD  [address] [address] [facility]  [phone]    Schedule an appointment as soon as possible for a visit in 2 days        DISCHARGE MEDICATIONS:  New Prescriptions    CEPHALEXIN (KEFLEX) 500 MG CAPSULE    Take 1 capsule by mouth 2 times daily for 7 days       (Please note that portions of this note were completed with a voice recognition program.  Efforts were made to edit the dictations but occasionally words are mis-transcribed.)    Ken Covarrubias DO, DO  Attending Emergency Physician       Ken Covarrubias DO  07/13/21 2119

## 2022-07-05 ENCOUNTER — APPOINTMENT (OUTPATIENT)
Dept: CT IMAGING | Age: 87
End: 2022-07-05
Payer: MEDICARE

## 2022-07-05 ENCOUNTER — HOSPITAL ENCOUNTER (OUTPATIENT)
Age: 87
Setting detail: OBSERVATION
Discharge: HOME | End: 2022-07-05
Attending: EMERGENCY MEDICINE | Admitting: HOSPITALIST
Payer: MEDICARE

## 2022-07-05 VITALS
TEMPERATURE: 97.8 F | WEIGHT: 140 LBS | SYSTOLIC BLOOD PRESSURE: 131 MMHG | RESPIRATION RATE: 18 BRPM | HEIGHT: 64 IN | BODY MASS INDEX: 23.9 KG/M2 | HEART RATE: 62 BPM | DIASTOLIC BLOOD PRESSURE: 84 MMHG | OXYGEN SATURATION: 98 %

## 2022-07-05 DIAGNOSIS — R11.2 NON-INTRACTABLE VOMITING WITH NAUSEA, UNSPECIFIED VOMITING TYPE: Primary | ICD-10-CM

## 2022-07-05 DIAGNOSIS — R10.9 ABDOMINAL PAIN, UNSPECIFIED ABDOMINAL LOCATION: ICD-10-CM

## 2022-07-05 PROBLEM — D72.829 LEUKOCYTOSIS: Status: ACTIVE | Noted: 2022-07-05

## 2022-07-05 LAB
-: ABNORMAL
ABSOLUTE EOS #: 0.37 K/UL (ref 0–0.4)
ABSOLUTE LYMPH #: 1.64 K/UL (ref 1–4.8)
ABSOLUTE MONO #: 1.3 K/UL (ref 0.1–1.2)
ALBUMIN SERPL-MCNC: 4.2 G/DL (ref 3.5–5.2)
ALBUMIN/GLOBULIN RATIO: 1.1 (ref 1–2.5)
ALP BLD-CCNC: 61 U/L (ref 35–104)
ALT SERPL-CCNC: 11 U/L (ref 5–33)
ANION GAP SERPL CALCULATED.3IONS-SCNC: 14 MMOL/L (ref 9–17)
AST SERPL-CCNC: 25 U/L
BASOPHILS # BLD: 0 % (ref 0–2)
BASOPHILS ABSOLUTE: 0.04 K/UL (ref 0–0.2)
BILIRUB SERPL-MCNC: 0.34 MG/DL (ref 0.3–1.2)
BILIRUBIN URINE: NEGATIVE
BUN BLDV-MCNC: 32 MG/DL (ref 8–23)
CALCIUM SERPL-MCNC: 9.7 MG/DL (ref 8.6–10.4)
CHLORIDE BLD-SCNC: 97 MMOL/L (ref 98–107)
CO2: 25 MMOL/L (ref 20–31)
COLOR: YELLOW
CREAT SERPL-MCNC: 0.92 MG/DL (ref 0.5–0.9)
EKG ATRIAL RATE: 89 BPM
EKG P AXIS: 95 DEGREES
EKG P-R INTERVAL: 234 MS
EKG Q-T INTERVAL: 354 MS
EKG QRS DURATION: 80 MS
EKG QTC CALCULATION (BAZETT): 430 MS
EKG R AXIS: -35 DEGREES
EKG T AXIS: 47 DEGREES
EKG VENTRICULAR RATE: 89 BPM
EOSINOPHILS RELATIVE PERCENT: 2 % (ref 1–4)
EPITHELIAL CELLS UA: ABNORMAL /HPF (ref 0–5)
GFR AFRICAN AMERICAN: >60 ML/MIN
GFR NON-AFRICAN AMERICAN: 57 ML/MIN
GFR SERPL CREATININE-BSD FRML MDRD: ABNORMAL ML/MIN/{1.73_M2}
GLUCOSE BLD-MCNC: 100 MG/DL (ref 70–99)
GLUCOSE URINE: NEGATIVE
HCT VFR BLD CALC: 40 % (ref 36–46)
HCT VFR BLD CALC: 40.5 % (ref 36–46)
HEMOGLOBIN: 13.4 G/DL (ref 12–16)
HEMOGLOBIN: 13.6 G/DL (ref 12–16)
INR BLD: 1
KETONES, URINE: NEGATIVE
LACTIC ACID, SEPSIS: 1 MMOL/L (ref 0.5–1.9)
LACTIC ACID, SEPSIS: 1.1 MMOL/L (ref 0.5–1.9)
LEUKOCYTE ESTERASE, URINE: ABNORMAL
LIPASE: 32 U/L (ref 13–60)
LYMPHOCYTES # BLD: 9 % (ref 24–44)
MAGNESIUM: 2.1 MG/DL (ref 1.6–2.6)
MCH RBC QN AUTO: 31 PG (ref 26–34)
MCH RBC QN AUTO: 31.1 PG (ref 26–34)
MCHC RBC AUTO-ENTMCNC: 33.5 G/DL (ref 31–37)
MCHC RBC AUTO-ENTMCNC: 33.6 G/DL (ref 31–37)
MCV RBC AUTO: 92.4 FL (ref 80–100)
MCV RBC AUTO: 92.7 FL (ref 80–100)
MONOCYTES # BLD: 7 % (ref 2–11)
NITRITE, URINE: NEGATIVE
OTHER OBSERVATIONS UA: ABNORMAL
PARTIAL THROMBOPLASTIN TIME: 23 SEC (ref 21.3–31.3)
PDW BLD-RTO: 14.7 % (ref 12.5–15.4)
PDW BLD-RTO: 14.9 % (ref 12.5–15.4)
PH UA: 6.5 (ref 5–8)
PLATELET # BLD: 248 K/UL (ref 140–450)
PLATELET # BLD: 258 K/UL (ref 140–450)
PMV BLD AUTO: 8 FL (ref 6–12)
PMV BLD AUTO: 9.4 FL (ref 8–14)
POTASSIUM SERPL-SCNC: 3.7 MMOL/L (ref 3.7–5.3)
PROCALCITONIN: 0.05 NG/ML
PROTEIN UA: NEGATIVE
PROTHROMBIN TIME: 10.1 SEC (ref 9.4–12.6)
RBC # BLD: 4.33 M/UL (ref 4–5.2)
RBC # BLD: 4.37 M/UL (ref 4–5.2)
RBC UA: ABNORMAL /HPF (ref 0–2)
SEG NEUTROPHILS: 82 % (ref 36–66)
SEGMENTED NEUTROPHILS ABSOLUTE COUNT: 15.97 K/UL (ref 1.8–7.7)
SODIUM BLD-SCNC: 136 MMOL/L (ref 135–144)
SPECIFIC GRAVITY UA: 1.01 (ref 1–1.03)
TOTAL PROTEIN: 8.1 G/DL (ref 6.4–8.3)
TROPONIN, HIGH SENSITIVITY: 10 NG/L (ref 0–14)
TROPONIN, HIGH SENSITIVITY: 11 NG/L (ref 0–14)
TURBIDITY: CLEAR
URINE HGB: NEGATIVE
UROBILINOGEN, URINE: NORMAL
WBC # BLD: 11.8 K/UL (ref 3.5–11)
WBC # BLD: 19.3 K/UL (ref 3.5–11)
WBC UA: ABNORMAL /HPF (ref 0–5)

## 2022-07-05 PROCEDURE — 97116 GAIT TRAINING THERAPY: CPT

## 2022-07-05 PROCEDURE — 94760 N-INVAS EAR/PLS OXIMETRY 1: CPT

## 2022-07-05 PROCEDURE — 85027 COMPLETE CBC AUTOMATED: CPT

## 2022-07-05 PROCEDURE — 99220 PR INITIAL OBSERVATION CARE/DAY 70 MINUTES: CPT | Performed by: HOSPITALIST

## 2022-07-05 PROCEDURE — 6360000004 HC RX CONTRAST MEDICATION: Performed by: EMERGENCY MEDICINE

## 2022-07-05 PROCEDURE — 36415 COLL VENOUS BLD VENIPUNCTURE: CPT

## 2022-07-05 PROCEDURE — 99285 EMERGENCY DEPT VISIT HI MDM: CPT

## 2022-07-05 PROCEDURE — 83690 ASSAY OF LIPASE: CPT

## 2022-07-05 PROCEDURE — 85730 THROMBOPLASTIN TIME PARTIAL: CPT

## 2022-07-05 PROCEDURE — 80053 COMPREHEN METABOLIC PANEL: CPT

## 2022-07-05 PROCEDURE — 97530 THERAPEUTIC ACTIVITIES: CPT

## 2022-07-05 PROCEDURE — 83735 ASSAY OF MAGNESIUM: CPT

## 2022-07-05 PROCEDURE — 85025 COMPLETE CBC W/AUTO DIFF WBC: CPT

## 2022-07-05 PROCEDURE — 2580000003 HC RX 258: Performed by: CLINICAL NURSE SPECIALIST

## 2022-07-05 PROCEDURE — G0378 HOSPITAL OBSERVATION PER HR: HCPCS

## 2022-07-05 PROCEDURE — 74177 CT ABD & PELVIS W/CONTRAST: CPT

## 2022-07-05 PROCEDURE — 83605 ASSAY OF LACTIC ACID: CPT

## 2022-07-05 PROCEDURE — 84484 ASSAY OF TROPONIN QUANT: CPT

## 2022-07-05 PROCEDURE — 84145 PROCALCITONIN (PCT): CPT

## 2022-07-05 PROCEDURE — 96374 THER/PROPH/DIAG INJ IV PUSH: CPT

## 2022-07-05 PROCEDURE — 2580000003 HC RX 258: Performed by: EMERGENCY MEDICINE

## 2022-07-05 PROCEDURE — 97162 PT EVAL MOD COMPLEX 30 MIN: CPT

## 2022-07-05 PROCEDURE — A4216 STERILE WATER/SALINE, 10 ML: HCPCS | Performed by: EMERGENCY MEDICINE

## 2022-07-05 PROCEDURE — 85610 PROTHROMBIN TIME: CPT

## 2022-07-05 PROCEDURE — 6370000000 HC RX 637 (ALT 250 FOR IP): Performed by: CLINICAL NURSE SPECIALIST

## 2022-07-05 PROCEDURE — 93005 ELECTROCARDIOGRAM TRACING: CPT | Performed by: EMERGENCY MEDICINE

## 2022-07-05 PROCEDURE — 81001 URINALYSIS AUTO W/SCOPE: CPT

## 2022-07-05 PROCEDURE — 97166 OT EVAL MOD COMPLEX 45 MIN: CPT

## 2022-07-05 PROCEDURE — 2500000003 HC RX 250 WO HCPCS: Performed by: EMERGENCY MEDICINE

## 2022-07-05 PROCEDURE — 2700000000 HC OXYGEN THERAPY PER DAY

## 2022-07-05 PROCEDURE — 97535 SELF CARE MNGMENT TRAINING: CPT

## 2022-07-05 RX ORDER — ONDANSETRON 4 MG/1
4 TABLET, ORALLY DISINTEGRATING ORAL EVERY 8 HOURS PRN
Status: DISCONTINUED | OUTPATIENT
Start: 2022-07-05 | End: 2022-07-05 | Stop reason: HOSPADM

## 2022-07-05 RX ORDER — SODIUM CHLORIDE 0.9 % (FLUSH) 0.9 %
5-40 SYRINGE (ML) INJECTION EVERY 12 HOURS SCHEDULED
Status: DISCONTINUED | OUTPATIENT
Start: 2022-07-05 | End: 2022-07-05 | Stop reason: HOSPADM

## 2022-07-05 RX ORDER — ONDANSETRON 2 MG/ML
4 INJECTION INTRAMUSCULAR; INTRAVENOUS EVERY 6 HOURS PRN
Status: DISCONTINUED | OUTPATIENT
Start: 2022-07-05 | End: 2022-07-05 | Stop reason: HOSPADM

## 2022-07-05 RX ORDER — SODIUM CHLORIDE 9 MG/ML
INJECTION, SOLUTION INTRAVENOUS CONTINUOUS
Status: DISCONTINUED | OUTPATIENT
Start: 2022-07-05 | End: 2022-07-05 | Stop reason: HOSPADM

## 2022-07-05 RX ORDER — SODIUM CHLORIDE 0.9 % (FLUSH) 0.9 %
10 SYRINGE (ML) INJECTION PRN
Status: DISCONTINUED | OUTPATIENT
Start: 2022-07-05 | End: 2022-07-05

## 2022-07-05 RX ORDER — POTASSIUM CHLORIDE 20 MEQ/1
40 TABLET, EXTENDED RELEASE ORAL PRN
Status: DISCONTINUED | OUTPATIENT
Start: 2022-07-05 | End: 2022-07-05 | Stop reason: HOSPADM

## 2022-07-05 RX ORDER — FLUTICASONE PROPIONATE 50 MCG
1 SPRAY, SUSPENSION (ML) NASAL DAILY
Status: DISCONTINUED | OUTPATIENT
Start: 2022-07-05 | End: 2022-07-05 | Stop reason: HOSPADM

## 2022-07-05 RX ORDER — VITAMIN B COMPLEX
2000 TABLET ORAL 2 TIMES DAILY
Status: DISCONTINUED | OUTPATIENT
Start: 2022-07-05 | End: 2022-07-05 | Stop reason: HOSPADM

## 2022-07-05 RX ORDER — POLYETHYLENE GLYCOL 3350 17 G/17G
17 POWDER, FOR SOLUTION ORAL DAILY PRN
Status: DISCONTINUED | OUTPATIENT
Start: 2022-07-05 | End: 2022-07-05 | Stop reason: HOSPADM

## 2022-07-05 RX ORDER — MAGNESIUM SULFATE 1 G/100ML
1000 INJECTION INTRAVENOUS PRN
Status: DISCONTINUED | OUTPATIENT
Start: 2022-07-05 | End: 2022-07-05 | Stop reason: HOSPADM

## 2022-07-05 RX ORDER — LEVOTHYROXINE SODIUM 0.07 MG/1
75 TABLET ORAL DAILY
Status: DISCONTINUED | OUTPATIENT
Start: 2022-07-05 | End: 2022-07-05 | Stop reason: HOSPADM

## 2022-07-05 RX ORDER — ROSUVASTATIN CALCIUM 5 MG/1
10 TABLET, COATED ORAL DAILY
Status: DISCONTINUED | OUTPATIENT
Start: 2022-07-05 | End: 2022-07-05 | Stop reason: HOSPADM

## 2022-07-05 RX ORDER — SODIUM CHLORIDE 9 MG/ML
INJECTION, SOLUTION INTRAVENOUS PRN
Status: DISCONTINUED | OUTPATIENT
Start: 2022-07-05 | End: 2022-07-05 | Stop reason: HOSPADM

## 2022-07-05 RX ORDER — ACETAMINOPHEN 325 MG/1
650 TABLET ORAL EVERY 6 HOURS PRN
Status: DISCONTINUED | OUTPATIENT
Start: 2022-07-05 | End: 2022-07-05 | Stop reason: HOSPADM

## 2022-07-05 RX ORDER — ACETAMINOPHEN 650 MG/1
650 SUPPOSITORY RECTAL EVERY 6 HOURS PRN
Status: DISCONTINUED | OUTPATIENT
Start: 2022-07-05 | End: 2022-07-05 | Stop reason: HOSPADM

## 2022-07-05 RX ORDER — SODIUM CHLORIDE 0.9 % (FLUSH) 0.9 %
10 SYRINGE (ML) INJECTION PRN
Status: DISCONTINUED | OUTPATIENT
Start: 2022-07-05 | End: 2022-07-05 | Stop reason: HOSPADM

## 2022-07-05 RX ORDER — MONTELUKAST SODIUM 10 MG/1
10 TABLET ORAL NIGHTLY
Status: DISCONTINUED | OUTPATIENT
Start: 2022-07-05 | End: 2022-07-05 | Stop reason: HOSPADM

## 2022-07-05 RX ORDER — ENOXAPARIN SODIUM 100 MG/ML
40 INJECTION SUBCUTANEOUS DAILY
Status: DISCONTINUED | OUTPATIENT
Start: 2022-07-05 | End: 2022-07-05 | Stop reason: HOSPADM

## 2022-07-05 RX ORDER — FAMOTIDINE 20 MG/1
10 TABLET, FILM COATED ORAL 2 TIMES DAILY PRN
Status: DISCONTINUED | OUTPATIENT
Start: 2022-07-05 | End: 2022-07-05 | Stop reason: HOSPADM

## 2022-07-05 RX ORDER — POTASSIUM CHLORIDE 7.45 MG/ML
10 INJECTION INTRAVENOUS PRN
Status: DISCONTINUED | OUTPATIENT
Start: 2022-07-05 | End: 2022-07-05 | Stop reason: HOSPADM

## 2022-07-05 RX ORDER — 0.9 % SODIUM CHLORIDE 0.9 %
80 INTRAVENOUS SOLUTION INTRAVENOUS ONCE
Status: DISCONTINUED | OUTPATIENT
Start: 2022-07-05 | End: 2022-07-05

## 2022-07-05 RX ADMIN — Medication 80 ML: at 03:15

## 2022-07-05 RX ADMIN — SODIUM CHLORIDE: 9 INJECTION, SOLUTION INTRAVENOUS at 05:48

## 2022-07-05 RX ADMIN — FAMOTIDINE 20 MG: 10 INJECTION, SOLUTION INTRAVENOUS at 02:35

## 2022-07-05 RX ADMIN — IOPAMIDOL 75 ML: 755 INJECTION, SOLUTION INTRAVENOUS at 03:15

## 2022-07-05 RX ADMIN — LEVOTHYROXINE SODIUM 75 MCG: 75 TABLET ORAL at 06:11

## 2022-07-05 RX ADMIN — SODIUM CHLORIDE, PRESERVATIVE FREE 10 ML: 5 INJECTION INTRAVENOUS at 03:16

## 2022-07-05 RX ADMIN — Medication 2000 UNITS: at 08:47

## 2022-07-05 RX ADMIN — FLUTICASONE PROPIONATE 1 SPRAY: 50 SPRAY, METERED NASAL at 08:47

## 2022-07-05 ASSESSMENT — ENCOUNTER SYMPTOMS
SHORTNESS OF BREATH: 0
EYE PAIN: 0
DIARRHEA: 0
BACK PAIN: 0
COUGH: 0
VOMITING: 1
SORE THROAT: 0
RHINORRHEA: 0
NAUSEA: 1

## 2022-07-05 ASSESSMENT — PAIN - FUNCTIONAL ASSESSMENT: PAIN_FUNCTIONAL_ASSESSMENT: NONE - DENIES PAIN

## 2022-07-05 NOTE — H&P
[facility]  Office: [address], DO, Carlton Hickset, DO, Lenka Padilla, DO, Barry Sly Webb, DO, David Villanueva MD, Alice Tucker MD, Guy Miranda MD, Lukas Alvarez MD, Lino Hairston MD, Marylou Thompson MD, Noman Degroot MD, Clayton Degroot, DO, Venita Randhawa MD,  Claudetta Shilling, DO, Conchis Wong MD, Deidra Perez MD, Edith Harrison, DO, Pilar Villarreal MD, Ashok Medrano MD, Patricia Feliciano DO, Varghese Keyes MD, Sharee Walter MD, Ezekiel Jarvis, [facility], [facility], [facility], Lucian Buckner, CNP, Carla Madrid, CNP, Megan Ross, CNP, Karlo Cota, CNP, Margie Wyman PA-C, Adarsh Robles, DNP, Aspen Bernabe, CNP, Fabiola Cheema, CNP, Jose Leon, CNP, Amparo Bellamy, CNS, Catracho Jackson, [facility], Gladis Mercado, CNP, Harleen Lopez, CNP, Kristi Cotton, [address]    HISTORY AND PHYSICAL EXAMINATION            Date:   7/5/2022  Patient name:  Emily Peterson  Date of admission:  7/5/2022  2:14 AM  MRN:   [id number]  Account:  [de-identified]  YOB: 1932  PCP:    Isaac Mireles MD  Room:   335/335-01  Code Status:    Full Code    Chief Complaint:     Chief Complaint   Patient presents with    Emesis     Tonight @ home had 1 bout of emesis. EMS called and then vomited another time. IV and Zofran PTA. This very pleasant 72-year-old female presents to the hospital with nausea, vomiting, abdominal pain. Patient states \"I feel better\"    History Obtained From:     patient, electronic medical record    History of Present Illness:     Emily Peterson is a 80 y.o. Non- / non  female who presents with Emesis (Tonight @ home had 1 bout of emesis. EMS called and then vomited another time. IV and Zofran PTA. )   and is admitted to the hospital for the management of Leukocytosis. This very pleasant 72-year-old female presents the hospital with abdominal pain along with nausea, vomiting. She underwent imaging studies of the abdomen and pelvis which showed nonspecific enteritis and what appears to be a small splenic infarct. The patient was found to have a nonspecific leukocytosis. She has been admitted and started on IV fluids. Her symptoms have completely resolved at this point in time. She has no abdominal pain to speak of and her nausea and vomiting has completely resolved. At this point in time I had a long discussion with the patient regarding the diagnosis of enteritis as well as the nonspecific splenic abnormality. It is felt to be indeterminant especially given her absence of trauma. Recommendations are for repeat imaging however this can occur on an outpatient basis. Providing the patient tolerates a diet without issue I suspect she can be discharged home this afternoon with further work-up regarding the splenic abnormalities occur as an outpatient. Patient denies any questions or concerns at this point in time. 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  PRE-MALIGNANT / BENIGN SKIN LESION EXCISION  3/26/2013    Excision lesion of nose. Dr. Mary Noriega        Medications Prior to Admission:     Prior to Admission medications    Medication Sig Start Date End Date Taking? Authorizing Provider   silver sulfADIAZINE (SILVADENE) 1 % cream Apply topically daily Apply topically daily. chills, sweats, fatigue, weight loss  HEENT:  negative for vision, hearing changes, runny nose, throat pain  RESPIRATORY:  negative for shortness of breath, cough, congestion, wheezing  CARDIOVASCULAR:  negative for chest pain, palpitations  GASTROINTESTINAL: Patient's nausea, vomiting and abdominal pain have all resolved at this point in time. GENITOURINARY:  negative for difficulty of urination, burning with urination, frequency   INTEGUMENT:  negative for rash, skin lesions, easy bruising   HEMATOLOGIC/LYMPHATIC:  negative for swelling/edema   ALLERGIC/IMMUNOLOGIC:  negative for urticaria , itching  ENDOCRINE:  negative increase in drinking, increase in urination, hot or cold intolerance  MUSCULOSKELETAL:  negative joint pains, muscle aches, swelling of joints  NEUROLOGICAL:  negative for headaches, dizziness, lightheadedness, numbness, pain, tingling extremities  BEHAVIOR/PSYCH:  negative for depression, anxiety    Physical Exam:   BP (!) 155/73   Pulse 69   Temp 98.1 °F (36.7 °C) (Oral)   Resp 16   Ht 5' 4\" (1.626 m)   Wt 140 lb (63.5 kg)   SpO2 97%   BMI 24.03 kg/m²   Temp (24hrs), Av.2 °F (36.8 °C), Min:97.9 °F (36.6 °C), Max:98.6 °F (37 °C)    No results for input(s): POCGLU in the last 72 hours.   No intake or output data in the 24 hours ending 22 1138    General Appearance: alert, well appearing, and in no acute distress  Mental status: oriented to person, place, and time  Head: normocephalic, atraumatic  Eye: no icterus, redness, pupils equal and reactive, extraocular eye movements intact, conjunctiva clear  Ear: normal external ear, no discharge, hearing intact  Nose: no drainage noted  Mouth: mucous membranes moist  Neck: supple, no carotid bruits, thyroid not palpable  Lungs: Bilateral equal air entry, clear to ausculation, no wheezing, rales or rhonchi, normal effort  Cardiovascular: normal rate, regular rhythm, no murmur, gallop, rub  Abdomen: Soft, nontender, nondistended, normal bowel sounds, no hepatomegaly or splenomegaly  Neurologic: There are no new focal motor or sensory deficits, normal muscle tone and bulk, no abnormal sensation, normal speech, cranial nerves II through XII grossly intact  Skin: No gross lesions, rashes, bruising or bleeding on exposed skin area  Extremities: peripheral pulses palpable, no pedal edema or calf pain with palpation  Psych: normal affect    Investigations:      Laboratory Testing:  Recent Results (from the past 24 hour(s))   Lipase    Collection Time: 07/05/22  2:23 AM   Result Value Ref Range    Lipase 32 13 - 60 U/L   Magnesium    Collection Time: 07/05/22  2:23 AM   Result Value Ref Range    Magnesium 2.1 1.6 - 2.6 mg/dL   Comprehensive Metabolic Panel    Collection Time: 07/05/22  2:23 AM   Result Value Ref Range    Glucose 100 (H) 70 - 99 mg/dL    BUN 32 (H) 8 - 23 mg/dL    CREATININE 0.92 (H) 0.50 - 0.90 mg/dL    Calcium 9.7 8.6 - 10.4 mg/dL    Sodium 136 135 - 144 mmol/L    Potassium 3.7 3.7 - 5.3 mmol/L    Chloride 97 (L) 98 - 107 mmol/L    CO2 25 20 - 31 mmol/L    Anion Gap 14 9 - 17 mmol/L    Alkaline Phosphatase 61 35 - 104 U/L    ALT 11 5 - 33 U/L    AST 25 <32 U/L    Total Bilirubin 0.34 0.3 - 1.2 mg/dL    Total Protein 8.1 6.4 - 8.3 g/dL    Albumin 4.2 3.5 - 5.2 g/dL    Albumin/Globulin Ratio 1.1 1.0 - 2.5    GFR Non- 57 (L) >60 mL/min    GFR African American >60 >60 mL/min    GFR Comment         CBC with Auto Differential    Collection Time: 07/05/22  2:23 AM   Result Value Ref Range    WBC 19.3 (H) 3.5 - 11.0 k/uL    RBC 4.37 4.0 - 5.2 m/uL    Hemoglobin 13.6 12.0 - 16.0 g/dL    Hematocrit 40.5 36 - 46 %    MCV 92.7 80 - 100 fL    MCH 31.1 26 - 34 pg    MCHC 33.6 31 - 37 g/dL    RDW 14.9 12.5 - 15.4 %    Platelets 926 865 - 732 k/uL    MPV 9.4 8.0 - 14.0 fL    Seg Neutrophils 82 (H) 36 - 66 %    Lymphocytes 9 (L) 24 - 44 %    Monocytes 7 2 - 11 %    Eosinophils % 2 1 - 4 %    Basophils 0 0 - 2 %    Segs Absolute 15.97 (H) 1.8 - 7.7 k/uL    Absolute Lymph # 1.64 1.0 - 4.8 k/uL    Absolute Mono # 1.30 (H) 0.1 - 1.2 k/uL    Absolute Eos # 0.37 0.0 - 0.4 k/uL    Basophils Absolute 0.04 0.0 - 0.2 k/uL   Troponin    Collection Time: 07/05/22  2:23 AM   Result Value Ref Range    Troponin, High Sensitivity 11 0 - 14 ng/L   Protime-INR    Collection Time: 07/05/22  2:23 AM   Result Value Ref Range    Protime 10.1 9.4 - 12.6 sec    INR 1.0    APTT    Collection Time: 07/05/22  2:23 AM   Result Value Ref Range    PTT 23.0 21.3 - 31.3 sec   Lactate, Sepsis    Collection Time: 07/05/22  2:23 AM   Result Value Ref Range    Lactic Acid, Sepsis 1.1 0.5 - 1.9 mmol/L   EKG 12 Lead    Collection Time: 07/05/22  2:27 AM   Result Value Ref Range    Ventricular Rate 89 BPM    Atrial Rate 89 BPM    P-R Interval 234 ms    QRS Duration 80 ms    Q-T Interval 354 ms    QTc Calculation (Bazett) 430 ms    P Axis 95 degrees    R Axis -35 degrees    T Axis 47 degrees   Troponin    Collection Time: 07/05/22  4:14 AM   Result Value Ref Range    Troponin, High Sensitivity 10 0 - 14 ng/L   Lactate, Sepsis    Collection Time: 07/05/22  4:14 AM   Result Value Ref Range    Lactic Acid, Sepsis 1.0 0.5 - 1.9 mmol/L   Urinalysis with Reflex to Culture    Collection Time: 07/05/22  4:15 AM    Specimen: Urine, clean catch   Result Value Ref Range    Color, UA Yellow Yellow    Turbidity UA Clear Clear    Glucose, Ur NEGATIVE NEGATIVE    Bilirubin Urine NEGATIVE NEGATIVE    Ketones, Urine NEGATIVE NEGATIVE    Specific Gravity, UA 1.010 1.005 - 1.030    Urine Hgb NEGATIVE NEGATIVE    pH, UA 6.5 5.0 - 8.0    Protein, UA NEGATIVE NEGATIVE    Urobilinogen, Urine Normal Normal    Nitrite, Urine NEGATIVE NEGATIVE    Leukocyte Esterase, Urine TRACE (A) NEGATIVE   Microscopic Urinalysis    Collection Time: 07/05/22  4:15 AM   Result Value Ref Range    -          WBC, UA 2 TO 5 0 - 5 /HPF    RBC, UA 0 TO 2 0 - 2 /HPF    Epithelial Cells UA 0 TO 2 0 - 5 /HPF    Other Observations UA (A) NOT REQ. Utilizing a urinalysis as the only screening method to exclude a potential uropathogen can be unreliable in many patient populations. Rapid screening tests are less sensitive than culture and if UTI is a clinical possibility, culture should be considered despite a negative urinalysis. CBC    Collection Time: 07/05/22 11:01 AM   Result Value Ref Range    WBC 11.8 (H) 3.5 - 11.0 k/uL    RBC 4.33 4.0 - 5.2 m/uL    Hemoglobin 13.4 12.0 - 16.0 g/dL    Hematocrit 40.0 36 - 46 %    MCV 92.4 80 - 100 fL    MCH 31.0 26 - 34 pg    MCHC 33.5 31 - 37 g/dL    RDW 14.7 12.5 - 15.4 %    Platelets 011 659 - 529 k/uL    MPV 8.0 6.0 - 12.0 fL       Imaging/Diagnostics:  CT ABDOMEN PELVIS W IV CONTRAST Additional Contrast? None    Addendum Date: 7/5/2022    ADDENDUM: The spleen finding mentioned in the body of the report is indeterminate. Small subcapsular hematoma is possible, although felt unlikely in the absence of trauma. Other differentials include a small infarct or focal area of capsular thickening. Recommend imaging follow-up to confirm stability. ADDENDUM: Addendum was discussed with Mark Srivastava at approximately 4:27 a.m. on 07/05/2022. Addendum Date: 7/5/2022    ADDENDUM: The spleen finding mentioned in the body of the report is indeterminate. Small subcapsular hematoma is possible, although felt unlikely in the absence of trauma. Other differentials include a small infarct or focal area of capsular thickening. Recommend imaging follow-up to confirm stability. Result Date: 7/5/2022  No acute intra-abdominal or pelvic abnormality Fluid-filled nondilated loops of small bowel throughout the abdomen may suggest enteritis. Colonic diverticulosis without acute diverticulitis Stable left adnexal cystic lesion, not significantly changed since at least February 22, 2020.  Atherosclerosis       Assessment :      Hospital Problems           Last Modified POA    * (Principal) Leukocytosis 7/5/2022 Yes Nausea and vomiting 7/5/2022 Yes          Plan:     Patient status observation in the Med/Surge    1. Enteritis, leukocytosis  1. Leukocytosis has improved without any specific intervention  2. Suspect viral enteritis  1. Supportive care  2. 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 Outpatient follow-up, repeat imaging with PCP  3. Hypothyroidism  1. Continue Synthroid  4. Hyperlipidemia  1.  Continue Crestor    Advance diet, discharge home if patient tolerates diet    Consultations:   None    Patient is admitted as observation status    Sade Baird DO  7/5/2022  11:38 AM    Copy sent to Dr. Shannan Shah MD

## 2022-07-05 NOTE — FLOWSHEET NOTE
Pt denies questions with discharge instructions and verbalizes understanding. Pt ambulated to and from bathroom with stand by assist using walker pt returned to chair had moderate gait.

## 2022-07-05 NOTE — DISCHARGE SUMMARY
[facility]  Office: [address], DO, Alesia Ocamporoe, DO, Nylafrank Dry, DO, Omero Collado Blood, DO, Melina Laguerre MD, Brittny Godinez MD, Jessica Yeager MD, Roxie Badillo MD, Mariaelena Way MD, Xavier Goetz MD, Alfredo Leiva MD, Diamante Taylor, DO, Nelda Mjaano MD,  Sofya Nixon, DO, Jasmyne Washburn MD, Karina Dai MD, Mercedes Costa DO, Mary Grace Loo MD, Lelia Starr MD, Ashok Diallo, DO, Vargas Tyler MD, Mary Lowery MD, Sondra Poon, [facility], [facility], CNP, Daniel Luong, CNP, Jon Velasco, CNP, Belen Fitzpatrick, CNP, Hardik Conway, CNP, ANABELLA RushC, Channing Craft, [facility], Phyllistdeb Gann, CNP, Arely Almonte, CNP, Florida Jones, CNP, Caitlin Miranda, CNS, Jian Kelly, [facility], Charla Hernandez, CNP, Alicia Castillo, CNP, Phoebe Teran, [facility]    Discharge Summary     Patient ID: Jhonny Martinez  :  1932   MRN: [id number]     ACCOUNT:  [de-identified]   Patient's PCP: Lexus Carreon MD  Admit Date: 2022   Discharge Date: 2022     Length of Stay: 1  Code Status:  Full Code  Admitting Physician: Nicki Case DO  Discharge Physician: Nicki Case DO     Active Discharge Diagnoses:     Hospital Problem Lists:  Principal Problem:    Leukocytosis  Active Problems:    Nausea and vomiting  Resolved Problems:    * No resolved hospital problems. *      Admission Condition:  fair     Discharged Condition: good    Hospital Stay:     Hospital Course:  Jhonny Martinez is a 80 y.o. female who was admitted for the management of   Leukocytosis , presented to ER with Emesis (Tonight @ home had 1 bout of emesis. EMS called and then vomited another time. IV and Zofran PTA. )    This very pleasant 27-year-old female presenting the hospital with a single episode of nausea vomiting and abdominal pain. She was found to have a nonspecific leukocytosis.   She underwent CT scanning [place] at approximately 4:27 a.m. on 07/05/2022. Addendum Date: 7/5/2022    ADDENDUM: The spleen finding mentioned in the body of the report is indeterminate. Small subcapsular hematoma is possible, although felt unlikely in the absence of trauma. Other differentials include a small infarct or focal area of capsular thickening. Recommend imaging follow-up to confirm stability. Result Date: 7/5/2022  No acute intra-abdominal or pelvic abnormality Fluid-filled nondilated loops of small bowel throughout the abdomen may suggest enteritis. Colonic diverticulosis without acute diverticulitis Stable left adnexal cystic lesion, not significantly changed since at least February 22, 2020. Atherosclerosis       Consultations:    Consults:     Final Specialist Recommendations/Findings:   None      The patient was seen and examined on day of discharge and this discharge summary is in conjunction with any daily progress note from day of discharge.     Discharge plan:     Disposition: Home    Physician Follow Up:     Debbie Stevens MD  [id number] [facility]  [phone]      Follow up for repeat CT scan of Abdomin/Pelvis to evaluate questionable splenic infarct seen on CT       Requiring Further Evaluation/Follow Up POST HOSPITALIZATION/Incidental Findings:   Patient instructed to follow-up for repeat CT scan to evaluate splenic abnormality seen on CT during this admission    Diet: regular diet    Activity: As tolerated    Instructions to Patient: None    Discharge Medications:      Medication List      CONTINUE taking these medications    Crestor 10 MG tablet  Generic drug: rosuvastatin     famotidine 10 MG tablet  Commonly known as: PEPCID     fluticasone 50 MCG/ACT nasal spray  Commonly known as: FLONASE     Hair/Skin/Nails/Biotin Tabs     lisinopril 20 MG tablet  Commonly known as: PRINIVIL;ZESTRIL  Take 1 tablet by mouth daily     metoprolol succinate 25 MG extended release tablet  Commonly known as: TOPROL XL  Take 1 tablet by mouth daily     montelukast 10 MG tablet  Commonly known as: SINGULAIR     silver sulfADIAZINE 1 % cream  Commonly known as: SILVADENE     Synthroid 75 MCG tablet  Generic drug: levothyroxine     Vitamin D3 75 MCG (3000 UT) Tabs     Zantac 150 MG tablet  Generic drug: raNITIdine            No discharge procedures on file. Time Spent on discharge is  20 mins in patient examination, evaluation, counseling as well as medication reconciliation, prescriptions for required medications, discharge plan and follow up. Electronically signed by   Luis A Grijalva DO  7/5/2022  11:49 AM      Thank you Dr. Rossy Villeda MD for the opportunity to be involved in this patient's care.

## 2022-07-05 NOTE — ED NOTES
[employer] Text to Junito Jones   Patient:   Marv Marques     YOB: 1932  MRN:   [id number]  Location: [place] [address]       7/5/22 4:51 AM  [address] or Facility: [facility] [place] NEW ADMISSION From: Dr Veronica Patton RE: Dorota Ángela: Homero Lynch to [facility], RN  07/05/22 1181

## 2022-07-05 NOTE — FLOWSHEET NOTE
[address]  PROGRESS NOTE    Shift date: 7/5/22  Shift day: Tuesday   Shift # 1    Room # 331/780-61   Name: Lucian Orozco                Referral: Spiritual Care Referral from RN    Admit Date & Time: 7/5/2022  2:14 AM    Assessment:  Lucian Orozco is a 80 y.o. female in the hospital. Upon entering the room writer and  intern observe Patient is sitting in the bedside chair. She welcomed writer and  intern. She shared how she was doing, noting that she just had physical therapy. She spoke of the community where she has lived and how she enjoys and has befriended her neighbors. She mentioned some concerns with family relations. She stated her intention to speak with  about her wishes and declined the invitation to complete her advance directives. She freely shared stories and memories of her life. She spoke of the struggles and opportunities she and her family faced. She expressed gratitude for the close ties she had with her family. She spoke of her end of life wishes and plans. She accessed her sense of humor during the conversation. Intervention:  Writer introduced self and title as  Writer offered space for Noemy  to express feelings, needs, and concerns and provided a ministry presence. Writer began providing education about health care decision makers prior to Pt declining completing her ACP documents. Writer affirmed Pt and offered words of support and encouragement. Outcome:  Patient shared stories and memories from her life. Patient declined the opportunity to complete ACP documents. Pt thanked writer for the visit. Plan:  Chaplains will remain available to offer spiritual and emotional support as needed.      07/05/22 1207   Encounter Summary   Encounter Overview/Reason  Initial Encounter   Service Provided For: Patient   Referral/Consult From: Nurse   Support System Children;Family members;Friends/neighbors   Last Encounter  07/05/22   Complexity of Encounter Moderate   Begin Time 1135   End Time  1205   Total Time Calculated 30 min   Encounter    Type Initial Screen/Assessment   Spiritual/Emotional needs   Type Spiritual Support   Advance Care Planning   Type ACP conversation   Assessment/Intervention/Outcome   Assessment Calm;Coping; Interrupted family processes   Intervention Active listening;Discussed meaning/purpose;Discussed illness injury and its impact; Explored/Affirmed feelings, thoughts, concerns;Explored Coping Skills/Resources;Sustaining Presence/Ministry of presence   Outcome Engaged in conversation; Connection/Belonging;Coping;Expressed Gratitude;Expressed feelings, needs, and concerns   Plan and Referrals   Plan/Referrals Continue Support (comment)       Electronically signed by Evelyne Mera on 7/5/2022 at 12:10 PM.  [facility]  [phone]

## 2022-07-05 NOTE — ED PROVIDER NOTES
[id number] [facility]  [address] [facility] [place] [address]  Phone: [phone]  Fax: [phone] [facility]          Pt Name: Amita Stephen  MRN: [id number]  [place] 1/16/1932  Date of evaluation: 7/5/2022      CHIEF COMPLAINT       Chief Complaint   Patient presents with    Emesis     Tonight @ home had 1 bout of emesis. EMS called and then vomited another time. IV and Zofran PTA. HISTORY OF PRESENT ILLNESS       Amita Stephen is a 80 y.o. female who presents with initially lower abdominal discomfort followed by nausea and vomiting. No diarrhea. Began shortly prior to arrival.  States she went to bed feeling fine. No known sick contacts. No fevers. She denies any abdominal surgical history. Nothing otherwise makes it better or worse. No chest pain or difficulty breathing. No other symptoms. She arrived via EMS and had Zofran and some fluids, and prior to arrival.  She is feeling better at this time    REVIEW OF SYSTEMS       Review of Systems   Constitutional: Negative for chills, fatigue and fever. HENT: Negative for rhinorrhea and sore throat. Eyes: Negative for pain. Respiratory: Negative for cough and shortness of breath. Cardiovascular: Negative for chest pain. Gastrointestinal: Positive for nausea and vomiting. Negative for diarrhea. Genitourinary: Negative for difficulty urinating. Musculoskeletal: Negative for back pain and neck pain. Skin: Negative for rash. Neurological: Negative for weakness and headaches. 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     SURGICAL HISTORY      has a past surgical history that includes Carotid endarterectomy (Right, 2004 or (140 lb). Her oral temperature is 98.6 °F (37 °C). Her blood pressure is 162/84 (abnormal) and her pulse is 92. Her respiration is 15 and oxygen saturation is 95%. Physical Exam  Vitals reviewed. Constitutional:       General: She is not in acute distress. Appearance: She is well-developed. She is not ill-appearing or toxic-appearing. HENT:      Head: Normocephalic and atraumatic. Right Ear: External ear normal.      Left Ear: External ear normal.   Eyes:      General: Lids are normal.   Neck:      Trachea: No tracheal deviation. Cardiovascular:      Rate and Rhythm: Normal rate and regular rhythm. Pulmonary:      Effort: Pulmonary effort is normal. No respiratory distress. Abdominal:      Palpations: Abdomen is soft. Tenderness: There is no abdominal tenderness. Comments: No significant abdominal tenderness. No distention. Skin:     General: Skin is warm and dry. Neurological:      Mental Status: She is alert. GCS: GCS eye subscore is 4. GCS verbal subscore is 5. GCS motor subscore is 6. Psychiatric:         Speech: Speech normal.           DIFFERENTIAL DIAGNOSIS/ MDM:     Plan to be to initiate a further GI work-up. DIAGNOSTIC RESULTS     EKG: All EKG's are interpreted by the Emergency Department Physician who either signs or Co-signs this chart in the absence of a cardiologist.  Interpreted by Kristin Martinez MD     Rhythm: sinus   Rate: 89  Axis: Left  Ectopy: PVC  Conduction: Sinus rhythm. First-degree AV block. Left axis. Otherwise normal conduction. ST Segments: No acute elevation depression  T Waves: No acute findings    Clinical Impression: Nonspecific ECG. Sinus rhythm. No acute infarction/ischemia noted.         RADIOLOGY:   Interpretation per the Radiologist below, if available at the time of this note:  CT ABDOMEN PELVIS W IV CONTRAST Additional Contrast? None   Final Result   Addendum 2 of 2   ADDENDUM:   The spleen finding mentioned in the body of the report is indeterminate. Small subcapsular hematoma is possible, although felt unlikely in the    absence   of trauma. Other differentials include a small infarct or focal area of   capsular thickening. Recommend imaging follow-up to confirm stability. ADDENDUM:   Addendum was discussed with Jeffy Sultana at approximately 4:27 a.m. on   07/05/2022. Final   No acute intra-abdominal or pelvic abnormality      Fluid-filled nondilated loops of small bowel throughout the abdomen may   suggest enteritis. Colonic diverticulosis without acute diverticulitis      Stable left adnexal cystic lesion, not significantly changed since at least   February 22, 2020. Atherosclerosis             No results found.     LABS:  Results for orders placed or performed during the hospital encounter of 07/05/22   Urinalysis with Reflex to Culture    Specimen: Urine, clean catch   Result Value Ref Range    Color, UA Yellow Yellow    Turbidity UA Clear Clear    Glucose, Ur NEGATIVE NEGATIVE    Bilirubin Urine NEGATIVE NEGATIVE    Ketones, Urine NEGATIVE NEGATIVE    Specific Gravity, UA 1.010 1.005 - 1.030    Urine Hgb NEGATIVE NEGATIVE    pH, UA 6.5 5.0 - 8.0    Protein, UA NEGATIVE NEGATIVE    Urobilinogen, Urine Normal Normal    Nitrite, Urine NEGATIVE NEGATIVE    Leukocyte Esterase, Urine TRACE (A) NEGATIVE   Lipase   Result Value Ref Range    Lipase 32 13 - 60 U/L   Magnesium   Result Value Ref Range    Magnesium 2.1 1.6 - 2.6 mg/dL   Comprehensive Metabolic Panel   Result Value Ref Range    Glucose 100 (H) 70 - 99 mg/dL    BUN 32 (H) 8 - 23 mg/dL    CREATININE 0.92 (H) 0.50 - 0.90 mg/dL    Calcium 9.7 8.6 - 10.4 mg/dL    Sodium 136 135 - 144 mmol/L    Potassium 3.7 3.7 - 5.3 mmol/L    Chloride 97 (L) 98 - 107 mmol/L    CO2 25 20 - 31 mmol/L    Anion Gap 14 9 - 17 mmol/L    Alkaline Phosphatase 61 35 - 104 U/L    ALT 11 5 - 33 U/L    AST 25 <32 U/L    Total Bilirubin 0.34 0.3 - 1.2 mg/dL    Total Protein 8.1 6.4 - 8.3 g/dL    Albumin 4.2 3.5 - 5.2 g/dL    Albumin/Globulin Ratio 1.1 1.0 - 2.5    GFR Non- 57 (L) >60 mL/min    GFR African American >60 >60 mL/min    GFR Comment         CBC with Auto Differential   Result Value Ref Range    WBC 19.3 (H) 3.5 - 11.0 k/uL    RBC 4.37 4.0 - 5.2 m/uL    Hemoglobin 13.6 12.0 - 16.0 g/dL    Hematocrit 40.5 36 - 46 %    MCV 92.7 80 - 100 fL    MCH 31.1 26 - 34 pg    MCHC 33.6 31 - 37 g/dL    RDW 14.9 12.5 - 15.4 %    Platelets 199 676 - 805 k/uL    MPV 9.4 8.0 - 14.0 fL    Seg Neutrophils 82 (H) 36 - 66 %    Lymphocytes 9 (L) 24 - 44 %    Monocytes 7 2 - 11 %    Eosinophils % 2 1 - 4 %    Basophils 0 0 - 2 %    Segs Absolute 15.97 (H) 1.8 - 7.7 k/uL    Absolute Lymph # 1.64 1.0 - 4.8 k/uL    Absolute Mono # 1.30 (H) 0.1 - 1.2 k/uL    Absolute Eos # 0.37 0.0 - 0.4 k/uL    Basophils Absolute 0.04 0.0 - 0.2 k/uL   Troponin   Result Value Ref Range    Troponin, High Sensitivity 11 0 - 14 ng/L   Troponin   Result Value Ref Range    Troponin, High Sensitivity 10 0 - 14 ng/L   Protime-INR   Result Value Ref Range    Protime 10.1 9.4 - 12.6 sec    INR 1.0    APTT   Result Value Ref Range    PTT 23.0 21.3 - 31.3 sec   Lactate, Sepsis   Result Value Ref Range    Lactic Acid, Sepsis 1.1 0.5 - 1.9 mmol/L   Lactate, Sepsis   Result Value Ref Range    Lactic Acid, Sepsis 1.0 0.5 - 1.9 mmol/L   Microscopic Urinalysis   Result Value Ref Range    -          WBC, UA 2 TO 5 0 - 5 /HPF    RBC, UA 0 TO 2 0 - 2 /HPF    Epithelial Cells UA 0 TO 2 0 - 5 /HPF    Other Observations UA (A) NOT REQ. Utilizing a urinalysis as the only screening method to exclude a potential uropathogen can be unreliable in many patient populations. Rapid screening tests are less sensitive than culture and if UTI is a clinical possibility, culture should be considered despite a negative urinalysis.        EMERGENCY DEPARTMENT COURSE:     The patient was given the following medications:  Orders Placed This Encounter   Medications    famotidine (PEPCID) 20 mg in sodium chloride (PF) 10 mL injection    0.9 % sodium chloride bolus    iopamidol (ISOVUE-370) 76 % injection 75 mL    sodium chloride flush 0.9 % injection 10 mL        Vitals:    Vitals:    07/05/22 0209   BP: (!) 162/84   Pulse: 92   Resp: 15   Temp: 98.6 °F (37 °C)   TempSrc: Oral   SpO2: 95%   Weight: 63.5 kg (140 lb)   Height: 5' 4\" (1.626 m)     -------------------------  BP: (!) 162/84, Temp: 98.6 °F (37 °C), Heart Rate: 92, Resp: 15      Re-evaluation Notes    Patient doing well on reevaluation. No acute changes. She does have significant leukocytosis unexplained etiology other than possibly viral syndrome or perhaps it is too early to radiographically or otherwise detect if there is significant pathology not yet detected. I discussed this with the patient and family and they are comfortable and agreeable with admission for further monitoring and trending of her labs. Urinalysis shows no obvious infection to otherwise explain the leukocytosis. Cardiac enzyme negative. Lactic acid negative. CT shows possible/probable enteritis that is most likely viral.  After discussion with the hospitalist we will hold on antibiotics for now and monitor the patient. Clinically she does appear well and is nontoxic or septic appearing. CONSULTS:    None    CRITICAL CARE:     None    PROCEDURES:    None    FINAL IMPRESSION      1. Non-intractable vomiting with nausea, unspecified vomiting type    2. Abdominal pain, unspecified abdominal location          DISPOSITION/PLAN   DISPOSITION Admitted 07/05/2022 05:07:08 AM      Condition on Disposition    Improved    PATIENT REFERRED TO:  No follow-up provider specified.     DISCHARGE MEDICATIONS:  New Prescriptions    No medications on file       (Please note that portions of this note were completed with a voice recognition program.  Efforts were made to edit the dictations but occasionally words are mis-transcribed.)    Zachery Amaya DO, DO  Attending Emergency Physician       Zachery Amaya DO  07/05/22 9910

## 2022-07-05 NOTE — PROGRESS NOTES
Occupational Therapy  Facility/Department: [facility]  Occupational Therapy Initial Assessment      Name: Naun Bates  : 1932  MRN: [id number]  Date of Service: 2022    Chief Complaint   Patient presents with    Emesis     Tonight @ home had 1 bout of emesis. EMS called and then vomited another time. IV and Zofran PTA. Discharge Recommendations:  OT Equipment Recommendations  Equipment Needed: No     Patient Diagnosis(es): The primary encounter diagnosis was Non-intractable vomiting with nausea, unspecified vomiting type. A diagnosis of Abdominal pain, unspecified abdominal location was also pertinent to this visit. 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 Past Surgical History:  has a past surgical history that includes Carotid endarterectomy (Right,  or ); eye surgery (Bilateral); and pre-malignant / benign skin lesion excision (3/26/2013). Assessment   Performance deficits / Impairments: Decreased functional mobility ; Decreased endurance;Decreased ADL status; Decreased balance;Decreased strength;Decreased safe awareness;Decreased high-level IADLs;Decreased cognition  Assessment: Pt previously functioned Independently / Mod I for all ADLs and Mobility. At this time, she has impairments including: decreased endurance, decreased strength, increased fatigue, decreased cognition - which impact her ability to regain her PLOF. Pt would benefit from continued Acute OT services (to which she is agreeable). She would also benefit from continuation of Post-Acute OT Services (to which Pt has adamantly refused, despite max education from Therapist).   Prognosis: Fair  Decision Making: Medium Complexity  No Skilled OT: (Acute OT services only. Pt refused OT services after DC from Hospital.)  REQUIRES OT FOLLOW-UP: Yes  Activity Tolerance  Activity Tolerance: Patient Tolerated treatment well        Plan   Plan  Times per Week: 4-5x/week  Current Treatment Recommendations: Strengthening,Balance training,Functional mobility training,Endurance training,Equipment evaluation, education, & procurement,Patient/Caregiver education & training,Safety education & training,Home management training,Self-Care / ADL,Cognitive/Perceptual training     Restrictions  Restrictions/Precautions  Restrictions/Precautions: General Precautions,Fall Risk  Required Braces or Orthoses?: No  Position Activity Restriction  Other position/activity restrictions: Up with assist    Subjective   General  Patient assessed for rehabilitation services?: Yes  Family / Caregiver Present: No  Subjective  Subjective: RN approved Pt to be seen for OT Evaluation. General Comment  Comments: Pt was cooperative / agreeable throughout. Social/Functional History  Social/Functional History  Lives With: Alone  Type of Home: Apartment  Home Layout: One level  Home Access: Stairs to enter with rails  Entrance Stairs - Number of Steps: 1  Entrance Stairs - Rails: Right  Bathroom Shower/Tub: Tub/Shower unit  Bathroom Toilet: Handicap height  Bathroom Equipment: Grab bars in shower,Shower chair  Home Equipment: Waynetta Bears, rolling,Cane,Walker, 4 wheeled  Has the patient had two or more falls in the past year or any fall with injury in the past year?: Yes  Receives Help From: Family  ADL Assistance: Independent (Pt reports increased difficulty (recently) getting into / out of the tub/shower. She has shower chair that is too big (does not fit in shower, instead uses stool in shower). Increased difficulty washing hair, and LB Dressing / Bathing (feet / toes). )  Bath: Modified independent (Increased difficulty)  Dressing: Modified independent (Increased difficulty)  Grooming: Modified independent   Feeding: Modified independent   Toileting: Independent (Mod I)  Homemaking Assistance: Needs assistance (HHA does laundry and cleaning; son takes her grocery shopping.)  Homemaking Responsibilities: Yes  Ambulation Assistance: Independent (Uses all devices depending on environment)  Transfer Assistance: Independent  Active : No  Patient's  Info: family drives; occasionally takes Transit  Occupation: Retired  Leisure & Hobbies: cards, Bingo  Additional Comments: Has HHA twice monthly (2nd week and 4th week of the month) for 2 hours - to do laundry, cleaning bathroom / mopping kitchen. Objective   Observation/Palpation  Posture: Fair  Safety Devices  Type of Devices: Patient at risk for falls; Left in chair;Nurse notified;Gait belt; Chair alarm in place;Call light within reach  Restraints  Restraints Initially in Place: No    Bed Mobility Training  Bed Mobility Training: Yes  Overall Level of Assistance: Stand-by assistance; Adaptive equipment; Additional time (Use of hospital bed, HOB elevated at 30*, Handrail (Left))  Interventions: Verbal cues (Mod Cues task initiation & sequencing / accuracy with DME)  Supine to Sit: Stand-by assistance; Additional time; Adaptive equipment  Scooting: Stand-by assistance; Additional time; Adaptive equipment    Balance  Sitting: Without support  Standing: With support (Static standing balance (x4 trials, 30-60 sec each), UE support with RW, CGA)  Transfer Training  Transfer Training: Yes  Overall Level of Assistance: Contact-guard assistance; Adaptive equipment; Additional time (Using RW)  Interventions: Verbal cues (Mod Cues task initiation & sequencing / accuracy with DME)  Sit to Stand: Contact-guard assistance; Additional time; Adaptive equipment  Stand to Sit: Additional time; Adaptive equipment;Contact-guard assistance  Stand Pivot Transfers: Contact-guard assistance; Additional time; Adaptive equipment  Bed to Chair: Contact-guard assistance; Additional time;Adaptive equipment    Gait  Overall Level of Assistance: Contact-guard assistance; Additional time; Adaptive equipment (Using RW)  Interventions: Verbal cues (Mod Cues task initiation & sequencing / accuracy with DME / safety)     ADL  Grooming: Stand by assistance;Verbal cueing; Increased time to complete  Grooming Skilled Clinical Factors: Seated in recliner. UE Dressing: Stand by assistance; Increased time to complete;Verbal cueing  UE Dressing Skilled Clinical Factors: Seated at EOB. LE Dressing: Stand by assistance;Verbal cueing; Increased time to complete  LE Dressing Skilled Clinical Factors: Seated at EOB to doff / don Bilateral shoes. Activity Tolerance  Activity Tolerance: Patient limited by fatigue;Patient limited by endurance     Cognition  Overall Cognitive Status: Exceptions  Arousal/Alertness: Delayed responses to stimuli  Following Commands: Follows one step commands with increased time; Follows one step commands with repetition  Attention Span: Appears intact  Safety Judgement: Decreased awareness of need for assistance;Decreased awareness of need for safety  Problem Solving: Assistance required to generate solutions;Assistance required to implement solutions;Decreased awareness of errors  Insights: Decreased awareness of deficits  Initiation: Requires cues for some  Sequencing: Requires cues for some  Orientation  Overall Orientation Status: Within Functional Limits  Orientation Level: Oriented X4     LUE AROM (degrees)  LUE AROM : WFL  LUE General AROM: BUE MMT 3+/5  Left Hand AROM (degrees)  Left Hand AROM: WFL  Left Hand General AROM: Left Hand MMT 4-/5  RUE AROM (degrees)  RUE AROM : WFL  RUE General AROM: BUE MMT 3+/5  Right Hand AROM (degrees)  Right Hand AROM: WFL  Right Hand General AROM: Right Hand MMT 4-/5    AM-PAC Score   AM-[facility] Inpatient Daily Activity Raw Score: 16 (07/05/22 1340)  AM-PAC Inpatient ADL T-Scale Score : 35.96 (07/05/22 1340)  ADL Inpatient CMS 0-100% Score: 53.32

## 2022-07-05 NOTE — PROGRESS NOTES
Physical Therapy  Facility/Department: [facility] MED SURG ICU  Physical Therapy Initial Assessment    Name: Jhonny Martinez  : 1932  MRN: [id number]  Date of Service: 2022  Chief Complaint   Patient presents with    Emesis     Tonight @ home had 1 bout of emesis. EMS called and then vomited another time. IV and Zofran PTA. Discharge Recommendations:  Patient would benefit from continued therapy after discharge   PT Equipment Recommendations  Equipment Needed: No  Other: Pt owns RW which writer recommends at this time. Patient Diagnosis(es): The primary encounter diagnosis was Non-intractable vomiting with nausea, unspecified vomiting type. A diagnosis of Abdominal pain, unspecified abdominal location was also pertinent to this visit. 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 Past Surgical History:  has a past surgical history that includes Carotid endarterectomy (Right,  or ); eye surgery (Bilateral); and pre-malignant / benign skin lesion excision (3/26/2013). Assessment   Body Structures, Functions, Activity Limitations Requiring Skilled Therapeutic Intervention: Decreased strength;Decreased safe awareness;Decreased functional mobility ; Decreased endurance;Decreased balance  Assessment: Pt presents with functional mobility deficits. Pt requires CGA for bed mobility and transfers with use of RW. Pt ambulates total of 55' (3 total bouts smaller distances) requiring increased seated rest breaks secondary to fatigue. Pt would be safe to return to prior living arrangement with ability to have PRN assist. Pt would benefit from continued therapy to address mobility deficits.   Therapy Prognosis: Good  Decision Making: Medium Complexity  Requires PT Follow-Up: Yes  Activity Tolerance  Activity Tolerance: Patient limited by fatigue;Patient limited by endurance     Plan   Plan  Plan:  (5-6x)  Current Treatment Recommendations: Strengthening,Balance training,Functional mobility training,Transfer training,Endurance training,Gait training,Stair training,Neuromuscular re-education,Safety education & training,Home exercise program,Therapeutic activities  Safety Devices  Type of Devices: Patient at risk for falls,Left in chair,Nurse notified,Gait belt,Chair alarm in place,Call light within reach  Restraints  Restraints Initially in Place: No     Restrictions  Restrictions/Precautions  Restrictions/Precautions: General Precautions,Fall Risk  Required Braces or Orthoses?: No  Position Activity Restriction  Other position/activity restrictions: Up with assist     Subjective   General  Patient assessed for rehabilitation services?: Yes  Response To Previous Treatment: Not applicable  Family / Caregiver Present: No  Referring Practitioner: Rivera Gusman  Referral Date : 07/05/22  Diagnosis: Leukocytosis  Follows Commands: Within Functional Limits  Subjective  Subjective: Pt sitting edge of bed with nursing wanting to use the restroom, Pt agreeable to therapy. Pt c/o arthritic pain. Social/Functional History  Social/Functional History  Lives With: Alone  Type of Home: Apartment  Home Layout: One level  Home Access: Stairs to enter with rails  Entrance Stairs - Number of Steps: 1  Entrance Stairs - Rails: Right  Bathroom Shower/Tub: Tub/Shower unit  Bathroom Toilet: Handicap height  Bathroom Equipment: Grab bars in shower,Shower chair  Home Equipment: Yi Joo, rolling,Cane,Walker, 4 wheeled  Has the patient had two or more falls in the past year or any fall with injury in the past year?: Yes  Receives Help From: Family  ADL Assistance: Independent (Pt reports increased difficulty (recently) getting into / out of the tub/shower.   She has shower chair that is too big (does not fit in shower, instead uses stool in shower). Increased difficulty washing hair, and LB Dressing / Bathing (feet / toes). )  Bath: Modified independent (Increased difficulty)  Dressing: Modified independent (Increased difficulty)  Grooming: Modified independent   Feeding: Modified independent   Toileting: Independent (Mod I)  Homemaking Assistance: Needs assistance (HHA does laundry and cleaning; son takes her grocery shopping.)  Homemaking Responsibilities: Yes  Ambulation Assistance: Independent (Uses all devices depending on environment)  Transfer Assistance: Independent  Active : No  Patient's  Info: family drives; occasionally takes Transit  Occupation: Retired  Leisure & Hobbies: cards, [employer]  Additional Comments: Has HHA twice monthly (2nd week and 4th week of the month) for 2 hours - to do laundry, cleaning bathroom / mopping kitchen. Vision/Hearing  Vision  Vision: Impaired  Vision Exceptions: Wears glasses at all times (supposed to wear them at all times but does not)  Hearing  Hearing: Exceptions to [facility]  Hearing Exceptions: Hard of hearing/hearing concerns    Cognition   Orientation  Overall Orientation Status: Within Functional Limits  Orientation Level: Oriented X4  Cognition  Overall Cognitive Status: Exceptions  Arousal/Alertness: Appropriate responses to stimuli  Following Commands: Follows one step commands with increased time; Follows one step commands with repetition  Attention Span: Appears intact  Safety Judgement: Decreased awareness of need for assistance;Decreased awareness of need for safety  Problem Solving: Assistance required to generate solutions;Assistance required to implement solutions;Decreased awareness of errors  Insights: Decreased awareness of deficits  Initiation: Requires cues for some  Sequencing: Requires cues for some     Objective   Observation/Palpation  Posture: Fair  Gross Assessment  AROM: Within functional limits  Strength:  Within functional limits  Sensation: Intact     AROM RLE (degrees)  RLE AROM: WFL  AROM LLE (degrees)  LLE AROM : WFL  AROM RUE (degrees)  RUE AROM : WFL  AROM LUE (degrees)  LUE AROM : WFL  Strength RLE  Strength RLE: WFL  Comment: Grossly 3+/5  Strength LLE  Strength LLE: WFL  Comment: Grossly 3+/5  Strength RUE  Comment: Grossly 3+/5  Strength LUE  Comment: Grossly 3+/5           Bed mobility  Scooting: Stand by assistance  Bed Mobility Comments: Pt sitting edge of bed upon arrival, retired to chair at end of session. Transfers  Sit to Stand: Contact guard assistance  Stand to sit: Contact guard assistance  Comment: Transfers done with use of RW. 3 total sit to stands done throughout session including bed, chair, and toilet height. Ambulation  Surface: level tile  Device: Rolling Walker  Assistance: Contact guard assistance  Quality of Gait: Short, shuffling gait. Gait Deviations: Slow Zohreh;Decreased step length;Decreased step height;Shuffles  Distance: 10', seated rest, 15', seated rest, 30'  Comments: Increased fatigue with ambulation. Pt reports usually she does what she can and then takes rest breaks as needed. More Ambulation?: No  Stairs/Curb  Stairs?: No     Balance  Posture: Fair  Sitting - Static: Fair;+  Sitting - Dynamic: Fair  Standing - Static: Fair;+  Standing - Dynamic: Fair  Comments: Standing balance assessed with RW. ~5 minutes spent static standing requiring CGA. AM-PAC Score  AM-PAC Inpatient Mobility Raw Score : 17 (07/05/22 1100)  AM-PAC Inpatient T-Scale Score : 42.13 (07/05/22 1100)  Mobility Inpatient CMS 0-100% Score: 50.57 (07/05/22 1100)  Mobility Inpatient CMS G-Code Modifier : CK (07/05/22 1100)          Goals  Short Term Goals  Time Frame for Short term goals: 14 visits  Short term goal 1: Pt able to ambulate 200' modified independent with use of RW. Short term goal 2: Pt to be independent in all bed mobility.   Short term goal 3: Pt to be modified independent in transfers with use of RW. Short term goal 4: Pt to tolerate 30 minutes of activity without fatigue. Short term goal 5: Pt able to ascend/descend 1 step modified independent with use of R handrail. Education  Patient Education  Education Given To: Patient  Education Provided: Role of Therapy;Plan of Care  Education Method: Verbal  Barriers to Learning: None  Education Outcome: Continued education needed;Verbalized understanding      Therapy Time   Individual Concurrent Group Co-treatment   Time In 0850         Time Out 0918         Minutes 28         Timed Code Treatment Minutes: [address], [facility]    Evaluation/treatment performed by Student PT under the supervision of co-signing PT who agrees with all evaluation/treatment and documentation.

## 2022-07-05 NOTE — ED NOTES
Petered Solo Rivera called back and spoke with Dr Vitaly Walker.      Asad Dos Santos RN  07/05/22 7907

## 2022-11-17 ENCOUNTER — HOSPITAL ENCOUNTER (EMERGENCY)
Age: 87
Discharge: HOME | End: 2022-11-17
Attending: EMERGENCY MEDICINE
Payer: MEDICARE

## 2022-11-17 ENCOUNTER — APPOINTMENT (OUTPATIENT)
Dept: CT IMAGING | Age: 87
End: 2022-11-17
Payer: MEDICARE

## 2022-11-17 VITALS
BODY MASS INDEX: 21.34 KG/M2 | DIASTOLIC BLOOD PRESSURE: 126 MMHG | SYSTOLIC BLOOD PRESSURE: 148 MMHG | HEIGHT: 64 IN | RESPIRATION RATE: 16 BRPM | OXYGEN SATURATION: 95 % | WEIGHT: 125 LBS | TEMPERATURE: 99.5 F | HEART RATE: 72 BPM

## 2022-11-17 DIAGNOSIS — K59.00 CONSTIPATION, UNSPECIFIED CONSTIPATION TYPE: ICD-10-CM

## 2022-11-17 DIAGNOSIS — U07.1 COVID: ICD-10-CM

## 2022-11-17 DIAGNOSIS — N39.0 URINARY TRACT INFECTION WITHOUT HEMATURIA, SITE UNSPECIFIED: Primary | ICD-10-CM

## 2022-11-17 LAB
ABSOLUTE EOS #: 0.2 K/UL (ref 0–0.4)
ABSOLUTE LYMPH #: 0.9 K/UL (ref 1–4.8)
ABSOLUTE MONO #: 0.7 K/UL (ref 0.1–1.2)
ALBUMIN SERPL-MCNC: 3.9 G/DL (ref 3.5–5.2)
ALBUMIN/GLOBULIN RATIO: 1.1 (ref 1–2.5)
ALP BLD-CCNC: 59 U/L (ref 35–104)
ALT SERPL-CCNC: 15 U/L (ref 5–33)
ANION GAP SERPL CALCULATED.3IONS-SCNC: 12 MMOL/L (ref 9–17)
AST SERPL-CCNC: 26 U/L
BACTERIA: ABNORMAL
BASOPHILS # BLD: 1 % (ref 0–2)
BASOPHILS ABSOLUTE: 0 K/UL (ref 0–0.2)
BILIRUB SERPL-MCNC: 0.3 MG/DL (ref 0.3–1.2)
BILIRUBIN URINE: NEGATIVE
BUN BLDV-MCNC: 39 MG/DL (ref 8–23)
CALCIUM SERPL-MCNC: 9.5 MG/DL (ref 8.6–10.4)
CHLORIDE BLD-SCNC: 97 MMOL/L (ref 98–107)
CO2: 25 MMOL/L (ref 20–31)
COLOR: YELLOW
CREAT SERPL-MCNC: 1.17 MG/DL (ref 0.5–0.9)
EOSINOPHILS RELATIVE PERCENT: 3 % (ref 1–4)
EPITHELIAL CELLS UA: ABNORMAL /HPF (ref 0–5)
FLU A ANTIGEN: NEGATIVE
FLU B ANTIGEN: NEGATIVE
GFR SERPL CREATININE-BSD FRML MDRD: 44 ML/MIN/1.73M2
GLUCOSE BLD-MCNC: 113 MG/DL (ref 70–99)
GLUCOSE URINE: NEGATIVE
HCT VFR BLD CALC: 40.4 % (ref 36–46)
HEMOGLOBIN: 13.7 G/DL (ref 12–16)
KETONES, URINE: NEGATIVE
LEUKOCYTE ESTERASE, URINE: ABNORMAL
LIPASE: 25 U/L (ref 13–60)
LYMPHOCYTES # BLD: 10 % (ref 24–44)
MAGNESIUM: 2.1 MG/DL (ref 1.6–2.6)
MCH RBC QN AUTO: 31.1 PG (ref 26–34)
MCHC RBC AUTO-ENTMCNC: 33.9 G/DL (ref 31–37)
MCV RBC AUTO: 91.6 FL (ref 80–100)
MONOCYTES # BLD: 9 % (ref 2–11)
NITRITE, URINE: NEGATIVE
OTHER OBSERVATIONS UA: ABNORMAL
PDW BLD-RTO: 14.6 % (ref 12.5–15.4)
PH UA: 5.5 (ref 5–8)
PLATELET # BLD: 212 K/UL (ref 140–450)
PMV BLD AUTO: 7.8 FL (ref 6–12)
POTASSIUM SERPL-SCNC: 3.7 MMOL/L (ref 3.7–5.3)
PROTEIN UA: NEGATIVE
RBC # BLD: 4.41 M/UL (ref 4–5.2)
RBC UA: ABNORMAL /HPF (ref 0–2)
S PYO AG THROAT QL: NEGATIVE
SARS-COV-2, RAPID: DETECTED
SEG NEUTROPHILS: 77 % (ref 36–66)
SEGMENTED NEUTROPHILS ABSOLUTE COUNT: 6.6 K/UL (ref 1.8–7.7)
SODIUM BLD-SCNC: 134 MMOL/L (ref 135–144)
SOURCE: NORMAL
SPECIFIC GRAVITY UA: 1.02 (ref 1–1.03)
SPECIMEN DESCRIPTION: ABNORMAL
TOTAL PROTEIN: 7.5 G/DL (ref 6.4–8.3)
TROPONIN, HIGH SENSITIVITY: 16 NG/L (ref 0–14)
TROPONIN, HIGH SENSITIVITY: 20 NG/L (ref 0–14)
TURBIDITY: CLEAR
URINE HGB: NEGATIVE
UROBILINOGEN, URINE: NORMAL
WBC # BLD: 8.5 K/UL (ref 3.5–11)
WBC UA: ABNORMAL /HPF (ref 0–5)

## 2022-11-17 PROCEDURE — 87804 INFLUENZA ASSAY W/OPTIC: CPT

## 2022-11-17 PROCEDURE — 84484 ASSAY OF TROPONIN QUANT: CPT

## 2022-11-17 PROCEDURE — 83735 ASSAY OF MAGNESIUM: CPT

## 2022-11-17 PROCEDURE — 87880 STREP A ASSAY W/OPTIC: CPT

## 2022-11-17 PROCEDURE — 87635 SARS-COV-2 COVID-19 AMP PRB: CPT

## 2022-11-17 PROCEDURE — 6360000002 HC RX W HCPCS: Performed by: EMERGENCY MEDICINE

## 2022-11-17 PROCEDURE — 99284 EMERGENCY DEPT VISIT MOD MDM: CPT

## 2022-11-17 PROCEDURE — 83690 ASSAY OF LIPASE: CPT

## 2022-11-17 PROCEDURE — 96365 THER/PROPH/DIAG IV INF INIT: CPT

## 2022-11-17 PROCEDURE — 87086 URINE CULTURE/COLONY COUNT: CPT

## 2022-11-17 PROCEDURE — 81001 URINALYSIS AUTO W/SCOPE: CPT

## 2022-11-17 PROCEDURE — 74176 CT ABD & PELVIS W/O CONTRAST: CPT

## 2022-11-17 PROCEDURE — 80053 COMPREHEN METABOLIC PANEL: CPT

## 2022-11-17 PROCEDURE — 96375 TX/PRO/DX INJ NEW DRUG ADDON: CPT

## 2022-11-17 PROCEDURE — 93005 ELECTROCARDIOGRAM TRACING: CPT | Performed by: EMERGENCY MEDICINE

## 2022-11-17 PROCEDURE — 85025 COMPLETE CBC W/AUTO DIFF WBC: CPT

## 2022-11-17 PROCEDURE — 2580000003 HC RX 258: Performed by: EMERGENCY MEDICINE

## 2022-11-17 RX ORDER — ASPIRIN 81 MG/1
81 TABLET, CHEWABLE ORAL DAILY
COMMUNITY

## 2022-11-17 RX ORDER — ONDANSETRON 2 MG/ML
4 INJECTION INTRAMUSCULAR; INTRAVENOUS ONCE
Status: COMPLETED | OUTPATIENT
Start: 2022-11-17 | End: 2022-11-17

## 2022-11-17 RX ORDER — LEVOFLOXACIN 5 MG/ML
500 INJECTION, SOLUTION INTRAVENOUS ONCE
Status: COMPLETED | OUTPATIENT
Start: 2022-11-17 | End: 2022-11-17

## 2022-11-17 RX ORDER — LEVOFLOXACIN 500 MG/1
500 TABLET, FILM COATED ORAL DAILY
Qty: 6 TABLET | Refills: 0 | Status: SHIPPED | OUTPATIENT
Start: 2022-11-17 | End: 2022-11-23

## 2022-11-17 RX ORDER — LISINOPRIL AND HYDROCHLOROTHIAZIDE 20; 12.5 MG/1; MG/1
TABLET ORAL
COMMUNITY
Start: 2018-09-23

## 2022-11-17 RX ORDER — 0.9 % SODIUM CHLORIDE 0.9 %
1000 INTRAVENOUS SOLUTION INTRAVENOUS ONCE
Status: COMPLETED | OUTPATIENT
Start: 2022-11-17 | End: 2022-11-17

## 2022-11-17 RX ORDER — AMLODIPINE BESYLATE 2.5 MG/1
TABLET ORAL
COMMUNITY

## 2022-11-17 RX ORDER — AZELASTINE HYDROCHLORIDE 0.5 MG/ML
1 SOLUTION/ DROPS OPHTHALMIC DAILY
COMMUNITY

## 2022-11-17 RX ADMIN — ONDANSETRON 4 MG: 2 INJECTION INTRAMUSCULAR; INTRAVENOUS at 03:49

## 2022-11-17 RX ADMIN — SODIUM CHLORIDE 1000 ML: 9 INJECTION, SOLUTION INTRAVENOUS at 03:46

## 2022-11-17 RX ADMIN — LEVOFLOXACIN 500 MG: 5 INJECTION, SOLUTION INTRAVENOUS at 06:22

## 2022-11-17 ASSESSMENT — ENCOUNTER SYMPTOMS
NAUSEA: 0
RHINORRHEA: 0
SHORTNESS OF BREATH: 0
VOMITING: 0
BACK PAIN: 0
EYE PAIN: 0
CONSTIPATION: 1
EYE DISCHARGE: 1
SORE THROAT: 1
COUGH: 0
ABDOMINAL PAIN: 0
DIARRHEA: 0

## 2022-11-17 ASSESSMENT — PAIN DESCRIPTION - LOCATION: LOCATION: HIP

## 2022-11-17 ASSESSMENT — PAIN DESCRIPTION - ORIENTATION: ORIENTATION: RIGHT;LEFT

## 2022-11-17 ASSESSMENT — PAIN - FUNCTIONAL ASSESSMENT: PAIN_FUNCTIONAL_ASSESSMENT: 0-10

## 2022-11-17 ASSESSMENT — PAIN SCALES - GENERAL: PAINLEVEL_OUTOF10: 8

## 2022-11-17 NOTE — ED PROVIDER NOTES
[facility] Emergency Department  [id number] [address] [facility] [address] [facility] [address]  Phone: [phone]  Fax: [phone] [facility]          Pt Name: Ely Shah  MRN: [id number]  [place] 1/16/1932  Date of evaluation: 11/17/2022      CHIEF COMPLAINT       Chief Complaint   Patient presents with    Constipation    Fatigue    Pharyngitis     Pt presents via ems from home dt co constipation. Pt reports she has not had a normal bm since 11/11. Pt states she did have one bout of emesis on Tuesday post drinking green tea. Pt also states she has a sore throat,cough and generalized weakness. HISTORY OF PRESENT ILLNESS       Ely Shah is a 80 y.o. female who presents with concern about constipation. She arrived via EMS. She also reports generalized fatigue. She states she has not had a bowel movement in about 6 days however has also not been eating any food and only drinking liquid over those days as well because she has not been feeling well. Her symptoms sound mostly like upper respiratory and lower respiratory infectious type symptoms. She also complains about pharyngitis. Nothing otherwise makes her symptoms better or worse. No significant cough. No difficulty breathing or chest pain. She reports minimal abdominal pain. No vomiting or diarrhea. No other symptoms or concerns at this time    REVIEW OF SYSTEMS       Review of Systems   Constitutional:  Negative for chills, fatigue and fever. HENT:  Positive for congestion and sore throat. Negative for rhinorrhea. Eyes:  Positive for discharge. Negative for pain. Respiratory:  Negative for cough and shortness of breath. Cardiovascular:  Negative for chest pain. Gastrointestinal:  Positive for constipation. Negative for abdominal pain, diarrhea, nausea and vomiting. Genitourinary:  Negative for difficulty urinating. Musculoskeletal:  Negative for back pain and neck pain.    Skin: Negative for rash. Neurological:  Negative for weakness and headaches. 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 SURGICAL HISTORY      has a past surgical history that includes Carotid endarterectomy (Right, 2004 or 2005); eye surgery (Bilateral); and pre-malignant / benign skin lesion excision (3/26/2013). CURRENT MEDICATIONS       Previous Medications    AMLODIPINE (NORVASC) 2.5 MG TABLET    amlodipine 2.5 mg tablet    ASPIRIN 81 MG CHEWABLE TABLET    Take 81 mg by mouth daily    AZELASTINE (OPTIVAR) 0.05 % OPHTHALMIC SOLUTION    Apply 1 drop to eye daily    CHOLECALCIFEROL (VITAMIN D3) 3000 UNITS TABS    Take 2,000 Units by mouth 2 times daily    CRESTOR 10 MG TABLET    Take 10 mg by mouth daily. FAMOTIDINE (PEPCID) 10 MG TABLET    Take 10 mg by mouth 2 times daily as needed    FLUTICASONE (FLONASE) 50 MCG/ACT NASAL SPRAY    1 spray by Each Nostril route daily    LEVOTHYROXINE (SYNTHROID) 75 MCG TABLET    Take 75 mcg by mouth Daily. LISINOPRIL (PRINIVIL;ZESTRIL) 20 MG TABLET    Take 1 tablet by mouth daily    LISINOPRIL-HYDROCHLOROTHIAZIDE (PRINZIDE;ZESTORETIC) 20-12.5 MG PER TABLET    Take by mouth    METOPROLOL SUCCINATE (TOPROL XL) 25 MG EXTENDED RELEASE TABLET    Take 1 tablet by mouth daily    MONTELUKAST (SINGULAIR) 10 MG TABLET    Take 10 mg by mouth nightly    MULTIPLE VITAMINS-MINERALS (HAIR/SKIN/NAILS/BIOTIN) TABS    Take by mouth    RANITIDINE (ZANTAC) 150 MG TABLET    Take 150 mg by mouth 2 times daily    SILVER SULFADIAZINE (SILVADENE) 1 % CREAM    Apply topically daily Apply topically daily.        ALLERGIES     is allergic to latex, banana, eggs or egg-derived products, other, peanut-containing drug products, penicillins, pollen extract, seasonal, and lidocaine. FAMILY HISTORY     She indicated that her mother is . She indicated that her father is . She indicated that only one of her four sisters is alive. She indicated that all of her three brothers are . family history includes Cancer in her sister; Cirrhosis in her mother; Heart Disease in her brother, brother, brother, father, sister, and sister; Hypertension in her mother. SOCIAL HISTORY      reports that she quit smoking about 41 years ago. Her smoking use included cigarettes. She has a 1.00 pack-year smoking history. She has never used smokeless tobacco. She reports that she does not drink alcohol and does not use drugs. PHYSICAL EXAM     INITIAL VITALS:  height is 5' 4\" (1.626 m) and weight is 56.7 kg (125 lb). Her oral temperature is 99.5 °F (37.5 °C). Her blood pressure is 171/84 (abnormal) and her pulse is 92. Her respiration is 18 and oxygen saturation is 94%. Physical Exam  Vitals reviewed. Constitutional:       General: She is not in acute distress. Appearance: She is well-developed. She is not ill-appearing or toxic-appearing. HENT:      Head: Normocephalic and atraumatic. Comments: Posterior pharynx does have some mild erythema. No asymmetry. Phonating normally. Rest of HEENT exam unremarkable. Right Ear: External ear normal.      Left Ear: External ear normal.   Eyes:      General: Lids are normal.   Neck:      Trachea: No tracheal deviation. Cardiovascular:      Rate and Rhythm: Normal rate and regular rhythm. Pulmonary:      Effort: Pulmonary effort is normal. No respiratory distress. Breath sounds: Normal breath sounds. Abdominal:      Palpations: Abdomen is soft. Tenderness: There is no abdominal tenderness. Comments: Benign abdominal exam.  No tenderness. Skin:     General: Skin is warm and dry. Neurological:      Mental Status: She is alert. GCS: GCS eye subscore is 4.  GCS verbal subscore is 5. GCS motor subscore is 6. Psychiatric:         Speech: Speech normal.         DIFFERENTIAL DIAGNOSIS/ MDM:     Based on the patient's symptoms I feel a CT scan is appropriate in addition to baseline lab work. We will also check viral swabs. DIAGNOSTIC RESULTS     EKG: All EKG's are interpreted by the Emergency Department Physician who either signs or Co-signs this chart in the absence of a cardiologist.    Interpreted by Carey Sawyer DO     Rhythm: normal sinus   Rate: normal  Axis: normal  Ectopy: none  Conduction: normal  ST Segments: no acute change  T Waves: no acute change    Clinical Impression: normal sinus rhythm with no acute changes/normal EKG. No acute infarction/ischemia noted. RADIOLOGY:   Interpretation per the Radiologist below, if available at the time of this note:  CT ABDOMEN PELVIS WO CONTRAST Additional Contrast? None   Final Result   1. Constipation with moderate stool volume in the colon. 2. Diverticulosis without acute diverticulitis. 3. Other nonacute similar findings as above, including a similar   benign-appearing simple cyst in the left ovary measuring 2.6 cm, seen dating   back to at least February of 2020. No results found. LABS:  Results for orders placed or performed during the hospital encounter of 11/17/22   Strep Screen Group A Throat    Specimen: Throat   Result Value Ref Range    Source . THROAT SWAB     Strep A Ag NEGATIVE NEGATIVE   COVID-19, Rapid    Specimen: Nasopharyngeal Swab   Result Value Ref Range    Specimen Description . NASOPHARYNGEAL SWAB     SARS-CoV-2, Rapid DETECTED (A) Not Detected   Rapid influenza A/B antigens    Specimen: Nasopharyngeal   Result Value Ref Range    Flu A Antigen NEGATIVE NEGATIVE    Flu B Antigen NEGATIVE NEGATIVE   Comprehensive Metabolic Panel   Result Value Ref Range    Glucose 113 (H) 70 - 99 mg/dL    BUN 39 (H) 8 - 23 mg/dL    Creatinine 1.17 (H) 0.50 - 0.90 mg/dL    Est, Glom Filt Rate 44 (L) >60 mL/min/1.73m2    Calcium 9.5 8.6 - 10.4 mg/dL    Sodium 134 (L) 135 - 144 mmol/L    Potassium 3.7 3.7 - 5.3 mmol/L    Chloride 97 (L) 98 - 107 mmol/L    CO2 25 20 - 31 mmol/L    Anion Gap 12 9 - 17 mmol/L    Alkaline Phosphatase 59 35 - 104 U/L    ALT 15 5 - 33 U/L    AST 26 <32 U/L    Total Bilirubin 0.3 0.3 - 1.2 mg/dL    Total Protein 7.5 6.4 - 8.3 g/dL    Albumin 3.9 3.5 - 5.2 g/dL    Albumin/Globulin Ratio 1.1 1.0 - 2.5   CBC with Auto Differential   Result Value Ref Range    WBC 8.5 3.5 - 11.0 k/uL    RBC 4.41 4.0 - 5.2 m/uL    Hemoglobin 13.7 12.0 - 16.0 g/dL    Hematocrit 40.4 36 - 46 %    MCV 91.6 80 - 100 fL    MCH 31.1 26 - 34 pg    MCHC 33.9 31 - 37 g/dL    RDW 14.6 12.5 - 15.4 %    Platelets 656 594 - 312 k/uL    MPV 7.8 6.0 - 12.0 fL    Seg Neutrophils 77 (H) 36 - 66 %    Lymphocytes 10 (L) 24 - 44 %    Monocytes 9 2 - 11 %    Eosinophils % 3 1 - 4 %    Basophils 1 0 - 2 %    Segs Absolute 6.60 1.8 - 7.7 k/uL    Absolute Lymph # 0.90 (L) 1.0 - 4.8 k/uL    Absolute Mono # 0.70 0.1 - 1.2 k/uL    Absolute Eos # 0.20 0.0 - 0.4 k/uL    Basophils Absolute 0.00 0.0 - 0.2 k/uL   Urinalysis with Reflex to Culture    Specimen: Urine, clean catch   Result Value Ref Range    Color, UA Yellow Yellow    Turbidity UA Clear Clear    Glucose, Ur NEGATIVE NEGATIVE    Bilirubin Urine NEGATIVE NEGATIVE    Ketones, Urine NEGATIVE NEGATIVE    Specific Gravity, UA 1.020 1.005 - 1.030    Urine Hgb NEGATIVE NEGATIVE    pH, UA 5.5 5.0 - 8.0    Protein, UA NEGATIVE NEGATIVE    Urobilinogen, Urine Normal Normal    Nitrite, Urine NEGATIVE NEGATIVE    Leukocyte Esterase, Urine LARGE (A) NEGATIVE   Magnesium   Result Value Ref Range    Magnesium 2.1 1.6 - 2.6 mg/dL   Lipase   Result Value Ref Range    Lipase 25 13 - 60 U/L   Troponin   Result Value Ref Range    Troponin, High Sensitivity 20 (H) 0 - 14 ng/L   Troponin   Result Value Ref Range    Troponin, High Sensitivity 16 (H) 0 - 14 ng/L   Microscopic Urinalysis Result Value Ref Range    WBC, UA 20 TO 50 0 - 5 /HPF    RBC, UA 2 TO 5 0 - 2 /HPF    Epithelial Cells UA 2 TO 5 0 - 5 /HPF    Bacteria, UA MANY (A) None    Other Observations UA Culture ordered based on defined criteria. (A) NOT REQ. EMERGENCY DEPARTMENT COURSE:     The patient was given the following medications:  Orders Placed This Encounter   Medications    0.9 % sodium chloride bolus    ondansetron (ZOFRAN) injection 4 mg    levoFLOXacin (LEVAQUIN) 500 MG/100ML infusion 500 mg     Order Specific Question:   Antimicrobial Indications     Answer:   Urinary Tract Infection    levoFLOXacin (LEVAQUIN) 500 MG tablet     Sig: Take 1 tablet by mouth daily for 6 days     Dispense:  6 tablet     Refill:  0        Vitals:    Vitals:    11/17/22 0502 11/17/22 0547 11/17/22 0602 11/17/22 0615   BP: (!) 162/83 (!) 166/80 (!) 171/84    Pulse:    92   Resp:       Temp:       TempSrc:       SpO2: 94% 93% 94%    Weight:       Height:         -------------------------  BP: (!) 171/84, Temp: 99.5 °F (37.5 °C), Heart Rate: 92, Resp: 18      Re-evaluation Notes    Patient is doing well on reevaluation. She is positive for COVID which I feel explains her fatigue. She is mildly constipated based on CT scan findings but otherwise no acute findings noted or any surgical findings. She has a benign abdomen. We did discuss increasing fiber and MiraLAX with her diet however she has not been eating much over the past 5 to 6 days due to her symptoms most likely secondary to [place]. I feel that is a large part why she is not been stooling. Clinically she appears well and otherwise nontoxic or septic. Cardiac enzymes are trending down and I do not feel there is acute coronary syndrome present. She is feeling better with IV fluids as well. She was advised to follow-up closely with her PCP. We will keep her on Levaquin for the UTI as well. She knows she can return at anytime if worsening or for any new certain symptoms.   She is comfortable with this plan. The patient presents with a Urinary Tract Infection. Evaluation of the abdomen and back is benign. No guarding, peritoneal signs, sepsis, toxicity, significant tenderness, life threatening or serious etiology was noted. The patient is tolerating PO intake. The patient appears stable for discharge and has been instructed to return immediately if the symptoms worsen in any way, or in 1-2 days if not improved for re-evaluation. We also discussed returning to the Emergency Department immediately if new or worsening symptoms occur. We have discussed the symptoms which are most concerning (e.g., abdominal or back pain, fever, a feeling of passing out, light headed, dizziness, chest pain, shortness of breath, persistent nausea and/or vomiting, numbness or weakness to the arms or legs, coolness or color change of the arms or legs) that necessitate immediate return. The patient understands that at this time there is no evidence for a more malignant underlying process, but the patient also understands that early in the process of an illness or injury, an emergency department workup can be falsely reassuring. Routine discharge counseling was given, and the patient understands that worsening, changing or persistent symptoms should prompt an immediate call or follow up with their primary physician or return to the emergency department. The importance of appropriate follow up was also discussed. I have reviewed the disposition diagnosis with the patient and or their family/guardian. I have answered their questions and given discharge instructions. They voiced understanding of these instructions and did not have any further questions or complaints. CONSULTS:    None    CRITICAL CARE:     None    PROCEDURES:    None    FINAL IMPRESSION      1. Urinary tract infection without hematuria, site unspecified    2. COVID    3.  Constipation, unspecified constipation type DISPOSITION/PLAN   DISPOSITION Decision To Discharge 11/17/2022 06:40:25 AM      Condition on Disposition    Improved    PATIENT REFERRED TO:  Candido Garber MD  [address] [facility]  [phone]    Schedule an appointment as soon as possible for a visit in 2 days      DISCHARGE MEDICATIONS:  New Prescriptions    LEVOFLOXACIN (LEVAQUIN) 500 MG TABLET    Take 1 tablet by mouth daily for 6 days       (Please note that portions of this note were completed with a voice recognition program.  Efforts were made to edit the dictations but occasionally words are mis-transcribed.)    Nupur Dupree DO, DO  Attending Emergency Physician        Nupur Dupree DO  11/17/22 2165

## 2022-11-17 NOTE — DISCHARGE INSTRUCTIONS
Medication:   Requested Prescriptions     Pending Prescriptions Disp Refills    alendronate (FOSAMAX) 70 MG tablet [Pharmacy Med Name: ALENDRONATE SODIUM 70 MG TAB] 12 tablet 1     Sig: TAKE 1 TABLET BY MOUTH ONCE WEEKLY BEFORE BREAKFAST WITH A FULL GLASS OF WATER, ON AN EMPTY STOMACH. REMAIN UPRIGHT FOR 30 MINUTES     Last Filled: 9.22.21    Last appt: 3/17/2022   Next appt: Visit date not found    Last OARRS: No flowsheet data found. PLEASE RETURN TO THE EMERGENCY DEPARTMENT IMMEDIATELY if your symptoms worsen in anyway or in 1-2 days if not improved for re-evaluation. You should immediately return to the ER for symptoms such as new or worsening pain, difficulty breathing or swallowing, a change in your voice, a feeling of passing out, light headed, dizziness, chest pain, headache, neck pain, rash, abdominal pain or vomiting. Take your medication as indicated and prescribed. If you are given an antibiotic then, make sure you get the prescription filled and take the antibiotics until finished. Please understand that at this time there is no evidence for a more serious underlying process, but that early in the process of an illness or injury, an emergency department workup can be falsely reassuring. You should contact your family doctor within the next 48 hours for a follow up appointment. Gavin Neely!!!    From [address] and [address] [facility] Emergency Services    On behalf of the Emergency Department staff at [address], I would like to thank you for giving us the opportunity to address your health care needs and concerns. We hope that during your visit, our service was delivered in a professional and caring manner. Please keep [address] in mind as we walk with you down the path to your own personal wellness. Please expect an automated text message or email from us so we can ask a few questions about your health and progress. Based on your answers, a clinician may call you back to offer help and instructions. Please understand that early in the process of an illness or injury, an emergency department workup can be falsely reassuring. If you notice any worsening, changing or persistent symptoms please call your family doctor or return to the ER immediately. Tell us how we did during your visit at [email or web address]   and let us know about your experience

## 2022-11-17 NOTE — ED NOTES
Patient was able to contact her niece, her niece will be able to pick her up within the hour. [employer] transport contacted and transport cancelled.      Lyly Brooke RN  11/17/22 3418

## 2022-11-18 LAB
CULTURE: NORMAL
SPECIMEN DESCRIPTION: NORMAL

## 2022-11-20 LAB
EKG ATRIAL RATE: 78 BPM
EKG P AXIS: 29 DEGREES
EKG P-R INTERVAL: 198 MS
EKG Q-T INTERVAL: 372 MS
EKG QRS DURATION: 84 MS
EKG QTC CALCULATION (BAZETT): 424 MS
EKG R AXIS: -17 DEGREES
EKG T AXIS: 32 DEGREES
EKG VENTRICULAR RATE: 78 BPM

## 2023-02-21 ENCOUNTER — HOSPITAL ENCOUNTER (EMERGENCY)
Age: 88
Discharge: HOME | End: 2023-02-21
Attending: EMERGENCY MEDICINE
Payer: MEDICARE

## 2023-02-21 ENCOUNTER — APPOINTMENT (OUTPATIENT)
Dept: GENERAL RADIOLOGY | Age: 88
End: 2023-02-21
Payer: MEDICARE

## 2023-02-21 VITALS
TEMPERATURE: 97.5 F | HEART RATE: 86 BPM | BODY MASS INDEX: 20.66 KG/M2 | WEIGHT: 121 LBS | OXYGEN SATURATION: 94 % | DIASTOLIC BLOOD PRESSURE: 81 MMHG | HEIGHT: 64 IN | RESPIRATION RATE: 23 BRPM | SYSTOLIC BLOOD PRESSURE: 167 MMHG

## 2023-02-21 DIAGNOSIS — R21 RASH AND OTHER NONSPECIFIC SKIN ERUPTION: Primary | ICD-10-CM

## 2023-02-21 DIAGNOSIS — R06.00 DYSPNEA, UNSPECIFIED TYPE: ICD-10-CM

## 2023-02-21 LAB
ABSOLUTE EOS #: 2.13 K/UL (ref 0–0.4)
ABSOLUTE LYMPH #: 1.34 K/UL (ref 1–4.8)
ABSOLUTE MONO #: 0.9 K/UL (ref 0.1–0.8)
ANION GAP SERPL CALCULATED.3IONS-SCNC: 12 MMOL/L (ref 9–17)
BACTERIA: ABNORMAL
BASOPHILS # BLD: 0 % (ref 0–2)
BASOPHILS ABSOLUTE: 0 K/UL (ref 0–0.2)
BILIRUBIN URINE: NEGATIVE
BNP SERPL-MCNC: 401 PG/ML
BUN SERPL-MCNC: 22 MG/DL (ref 8–23)
CALCIUM SERPL-MCNC: 10.1 MG/DL (ref 8.6–10.4)
CHLORIDE SERPL-SCNC: 97 MMOL/L (ref 98–107)
CO2 SERPL-SCNC: 27 MMOL/L (ref 20–31)
COLOR: YELLOW
CREAT SERPL-MCNC: 0.83 MG/DL (ref 0.5–0.9)
EOSINOPHILS RELATIVE PERCENT: 19 % (ref 1–4)
EPITHELIAL CELLS UA: ABNORMAL /HPF (ref 0–5)
GFR SERPL CREATININE-BSD FRML MDRD: >60 ML/MIN/1.73M2
GLUCOSE SERPL-MCNC: 99 MG/DL (ref 70–99)
GLUCOSE UR STRIP.AUTO-MCNC: NEGATIVE MG/DL
HCT VFR BLD AUTO: 42 % (ref 36–46)
HGB BLD-MCNC: 13.8 G/DL (ref 12–16)
KETONES UR STRIP.AUTO-MCNC: NEGATIVE MG/DL
LEUKOCYTE ESTERASE UR QL STRIP.AUTO: NEGATIVE
LYMPHOCYTES # BLD: 12 % (ref 24–44)
MCH RBC QN AUTO: 30.8 PG (ref 26–34)
MCHC RBC AUTO-ENTMCNC: 32.9 G/DL (ref 31–37)
MCV RBC AUTO: 93.5 FL (ref 80–100)
MONOCYTES # BLD: 8 % (ref 1–7)
MORPHOLOGY: NORMAL
NITRITE UR QL STRIP.AUTO: NEGATIVE
PDW BLD-RTO: 14.9 % (ref 12.5–15.4)
PLATELET # BLD AUTO: ABNORMAL K/UL (ref 140–450)
POTASSIUM SERPL-SCNC: 3.7 MMOL/L (ref 3.7–5.3)
PROT UR STRIP.AUTO-MCNC: 7 MG/DL (ref 5–8)
PROT UR STRIP.AUTO-MCNC: NEGATIVE MG/DL
RBC # BLD: 4.49 M/UL (ref 4–5.2)
RBC CLUMPS #/AREA URNS AUTO: ABNORMAL /HPF (ref 0–2)
SEG NEUTROPHILS: 61 % (ref 36–66)
SEGMENTED NEUTROPHILS ABSOLUTE COUNT: 6.83 K/UL (ref 1.8–7.7)
SODIUM SERPL-SCNC: 136 MMOL/L (ref 135–144)
SPECIFIC GRAVITY UA: 1.01 (ref 1–1.03)
TROPONIN I SERPL DL<=0.01 NG/ML-MCNC: 11 NG/L (ref 0–14)
TROPONIN I SERPL DL<=0.01 NG/ML-MCNC: 12 NG/L (ref 0–14)
TSH SERPL-ACNC: 3.07 UIU/ML (ref 0.3–5)
TURBIDITY: CLEAR
URINE HGB: NEGATIVE
UROBILINOGEN, URINE: NORMAL
WBC # BLD AUTO: 11.2 K/UL (ref 3.5–11)
WBC UA: ABNORMAL /HPF (ref 0–5)

## 2023-02-21 PROCEDURE — 84484 ASSAY OF TROPONIN QUANT: CPT

## 2023-02-21 PROCEDURE — 84443 ASSAY THYROID STIM HORMONE: CPT

## 2023-02-21 PROCEDURE — 81001 URINALYSIS AUTO W/SCOPE: CPT

## 2023-02-21 PROCEDURE — 99285 EMERGENCY DEPT VISIT HI MDM: CPT

## 2023-02-21 PROCEDURE — 85025 COMPLETE CBC W/AUTO DIFF WBC: CPT

## 2023-02-21 PROCEDURE — 36415 COLL VENOUS BLD VENIPUNCTURE: CPT

## 2023-02-21 PROCEDURE — 83880 ASSAY OF NATRIURETIC PEPTIDE: CPT

## 2023-02-21 PROCEDURE — 80048 BASIC METABOLIC PNL TOTAL CA: CPT

## 2023-02-21 PROCEDURE — 93005 ELECTROCARDIOGRAM TRACING: CPT | Performed by: EMERGENCY MEDICINE

## 2023-02-21 PROCEDURE — 71046 X-RAY EXAM CHEST 2 VIEWS: CPT

## 2023-02-21 RX ORDER — FLUOCINONIDE TOPICAL SOLUTION USP, 0.05% 0.5 MG/ML
1 SOLUTION TOPICAL 2 TIMES DAILY
COMMUNITY

## 2023-02-21 RX ORDER — CETIRIZINE HYDROCHLORIDE 10 MG/1
10 TABLET ORAL DAILY
COMMUNITY
End: 2023-02-21

## 2023-02-21 RX ORDER — HYDROXYZINE PAMOATE 25 MG/1
25 CAPSULE ORAL 2 TIMES DAILY
Qty: 28 CAPSULE | Refills: 0 | Status: SHIPPED | OUTPATIENT
Start: 2023-02-21 | End: 2023-03-07

## 2023-02-21 ASSESSMENT — ENCOUNTER SYMPTOMS
SORE THROAT: 0
RHINORRHEA: 0
ABDOMINAL PAIN: 0
VOMITING: 0
COUGH: 1
PHOTOPHOBIA: 0
CHEST TIGHTNESS: 1
BACK PAIN: 1
DIARRHEA: 0
NAUSEA: 0

## 2023-02-21 NOTE — ED PROVIDER NOTES
[facility] Emergency Department  [address] [address] [facility] [address] [facility] [address]  Phone: [phone]  Fax: [phone]        Pt Name: Zulma Payton  MRN: [id number]  [place] 1/16/1932  Date of evaluation: 2/21/23      CHIEF COMPLAINT     Chief Complaint   Patient presents with    Shortness of Breath    Rash     Pt reports she has a rash that she started meds for on the 18th. The rash has worsened and started having exertional sob today. HISTORY OF PRESENT ILLNESS  (Location/Symptom, Timing/Onset, Context/Setting, Quality, Duration, Modifying Factors, Severity.)    Zulma Payton is a 80 y.o. female who presents with shortness of breath that she has had intermittently for years. She states it seems to come on if she has done something she shouldn't have done or has been out in the wind. She does not feel short of breath when she is sitting here in the ED currently. She states that this is an ongoing issue and she has seen her primary care doctor for this multiple times. There is nothing different today. She reports chest heaviness that is not currently present. She notices the chest heaviness is random and she sometimes notices it both with exertion and at rest. She states she notices it when she bends over. She denies nausea or vomiting. No palpitations. No leg swelling. She sometimes has some puffiness of her left ankle. She has seen her PCP for this issue and has had x-rays in the past. She denies history of CAD. She has known history of hypertension and hyperlipidemia. She is not diabetic. She is a non-smoker. She has family history of CAD. She had an echocardiogram in March of 2022 which showed a normal EF. She has recently seen Dr Pao King for cardiac clearance for a future procedure and was cleared. Dr Pao King is aware of her intermittent chest pain and shortness of breath per her documentation.      The patient was recently started on triamcinolone and she thinks she is allergic to it. She has been applying it all over her body to a rash that she has. She has been using the Triamcinolone for about a month. The rash has been an intermittent problem for years. She does not think she has ever had a biopsy of the rash. She states she knows she has skin cancer on her scalp, but the rash on her chest and back has not been biopsied in the past. She has seen 2 dermatologists for this rash. She was told by the last dermatologist that he wanted to try the steroid cream first before doing a biopsy of anything. She states she has had a biopsy of her scalp and she knows she has skin cancer. She states she would really like a diagnosis of her skin condition today and help with her itching. REVIEW OF SYSTEMS    (2-9 systems for level 4, 10 or more for level 5)     Review of Systems   Constitutional:  Negative for chills and fever. HENT:  Negative for congestion, rhinorrhea and sore throat. Eyes:  Negative for photophobia and visual disturbance. Respiratory:  Positive for cough and chest tightness. Cardiovascular:  Positive for chest pain. Negative for palpitations. Gastrointestinal:  Negative for abdominal pain, diarrhea, nausea and vomiting. Genitourinary:  Negative for dysuria, frequency and urgency. Musculoskeletal:  Positive for back pain. Negative for neck pain. Skin:  Positive for rash. Negative for wound. Neurological:  Positive for light-headedness and headaches. Negative for dizziness. Hematological:  Negative for adenopathy. Does not bruise/bleed easily. 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     SURGICAL HISTORY      has a past surgical history that includes Carotid endarterectomy (Right, 2004 or 2005); eye surgery (Bilateral); and pre-malignant / benign skin lesion excision (3/26/2013).    CURRENTMEDICATIONS       Previous Medications    AMLODIPINE (NORVASC) 2.5 MG TABLET    amlodipine 2.5 mg tablet    ASPIRIN 81 MG CHEWABLE TABLET    Take 81 mg by mouth daily    AZELASTINE (OPTIVAR) 0.05 % OPHTHALMIC SOLUTION    Apply 1 drop to eye daily    CHOLECALCIFEROL (VITAMIN D3) 3000 UNITS TABS    Take 2,000 Units by mouth 2 times daily    CRESTOR 10 MG TABLET    Take 10 mg by mouth daily.    FAMOTIDINE (PEPCID) 10 MG TABLET    Take 10 mg by mouth 2 times daily as needed    FLUOCINONIDE (LIDEX) 0.05 % EXTERNAL SOLUTION    Apply 1 application topically 2 times daily Apply topically 2 times daily , leave on scalp for 15-30 minutes, then rinse off    FLUTICASONE (FLONASE) 50 MCG/ACT NASAL SPRAY    1 spray by Each Nostril route daily    LEVOTHYROXINE (SYNTHROID) 75 MCG TABLET    Take 75 mcg by mouth Daily.    LISINOPRIL (PRINIVIL;ZESTRIL) 20 MG TABLET    Take 1 tablet by mouth daily    LISINOPRIL-HYDROCHLOROTHIAZIDE (PRINZIDE;ZESTORETIC) 20-12.5 MG PER TABLET    Take by mouth    METOPROLOL SUCCINATE (TOPROL XL) 25 MG EXTENDED RELEASE TABLET    Take 1 tablet by mouth daily    MONTELUKAST (SINGULAIR) 10 MG TABLET    Take 10 mg by mouth nightly    MULTIPLE VITAMINS-MINERALS (HAIR/SKIN/NAILS/BIOTIN) TABS    Take by mouth    RANITIDINE (ZANTAC) 150 MG TABLET    Take 150 mg by mouth 2 times daily    SILVER SULFADIAZINE (SILVADENE) 1 % CREAM    Apply topically daily Apply topically daily.    TRIAMCINOLONE (KENALOG) 0.1 % OINTMENT    Apply 1 application topically 2 times daily       ALLERGIES     is allergic to latex, banana, eggs or egg-derived products, other, peanut-containing drug products, penicillins, pollen extract, seasonal, and lidocaine.    FAMILY HISTORY     She indicated that her mother is . She indicated that her father is . She indicated that only one of her four sisters is alive. She indicated that all of  her three brothers are . family history includes Cancer in her sister; Cirrhosis in her mother; Heart Disease in her brother, brother, brother, father, sister, and sister; Hypertension in her mother. SOCIAL HISTORY      reports that she quit smoking about 42 years ago. Her smoking use included cigarettes. She has a 1.00 pack-year smoking history. She has never used smokeless tobacco. She reports that she does not drink alcohol and does not use drugs. PHYSICAL EXAM    (up to 7 for level 4, 8 or more for level 5)   INITIAL VITALS:  height is 5' 4\" (1.626 m) and weight is 54.9 kg (121 lb). Her temperature is 97.5 °F (36.4 °C). Her blood pressure is 167/81 (abnormal) and her pulse is 86. Her respiration is 23 and oxygen saturation is 94%. Physical Exam  Vitals and nursing note reviewed. Constitutional:       Appearance: She is well-developed. HENT:      Head: Normocephalic and atraumatic. Cardiovascular:      Rate and Rhythm: Normal rate and regular rhythm. Heart sounds: No murmur heard. No friction rub. No gallop. Pulmonary:      Effort: Pulmonary effort is normal.      Breath sounds: Examination of the right-lower field reveals rales. Rales present. No decreased breath sounds, wheezing or rhonchi. Chest:      Chest wall: No tenderness. Abdominal:      Palpations: Abdomen is soft. Musculoskeletal:         General: Normal range of motion. Cervical back: Normal range of motion and neck supple. Right lower leg: No tenderness. No edema. Left lower leg: No tenderness. No edema. Lymphadenopathy:      Cervical: No cervical adenopathy. Skin:     General: Skin is warm and dry. Capillary Refill: Capillary refill takes less than 2 seconds. Findings: Erythema and rash present. Comments: Generalized, erythematous, blanching rash on the trunk and extremities. Raised plaques. Neurological:      General: No focal deficit present.       Mental Status: She is alert and oriented to person, place, and time.    Psychiatric:         Mood and Affect: Mood normal.         Behavior: Behavior normal.       DIFFERENTIAL DIAGNOSIS/ MDM:     Differential diagnosis considered: ACS, CHF, contact dermatitis, atopic dermatitis, pneumonia, pneumothorax    Chronic Conditions affecting care (DM,HTN,CA, etc): chronic rash, htn, chest pain    Social Determinants of Health affecting care (unable to care for self, lives alone, unemployed, homeless,etc): none    History source(s) (patient,spouse,parent,family,friend,EMS,etc): patient    Review of external sources (ECF,Hospital records,EMS report, radiology reports, etc): notes    Tests considered but not ordered: Not applicable    Independent interpretation of tests (eg.  X-ray, CAT scan, Doppler studies, EKG): EKG    Discussion of x-ray results with radiology: no    Consults: none    Consideration for admission/observation (even if discharged): yes    Prescription considerations: yes    Sepsis considered: no    Critical Care note written: no      DIAGNOSTIC RESULTS     EKG: All EKG's are interpreted by the Emergency Department Physician who either signs or Co-signs this chart in the absence of a cardiologist.    EKG Interpretation    Interpreted by emergency department physician    Rhythm: normal sinus  and 1 degree AV block  Rate: normal  Axis: normal  Ectopy: premature atrial contraction  Conduction: normal  ST Segments: normal  T Waves: normal  Q Waves: none    Clinical Impression: non-specific EKG    Vanessa Mcarthur MD      RADIOLOGY:        Interpretation per the Radiologist below, if available at the time of this note:    XR CHEST (2 VW)    Result Date: 2/21/2023  EXAMINATION: TWO XRAY VIEWS OF THE CHEST 2/21/2023 1:02 pm COMPARISON: CT of the abdomen and pelvis including the lower lungs from 11/17/2022 and 02/22/2020 HISTORY: ORDERING SYSTEM PROVIDED HISTORY: chest pain TECHNOLOGIST PROVIDED HISTORY: chest pain Reason for Exam: Pt c/o SOB and rash FINDINGS: Overlying ECG monitor leads. Moderate enlargement cardiac silhouette. Mediastinal structures midline with calcification aortic knob. Diffuse reticular interstitial abnormalities more severe mid lungs and toward the apex on the right with some apical pleural thickening. No confluent opacity, large pleural effusion or pneumothorax. Findings correspond to those seen the bases in 2022, and date back to 2020. Osteopenia and biconvex endplate abnormalities throughout the thoracic spine with mild-moderate kyphoscoliosis. No prior chest x-ray available; interstitial lung disease and some bronchiectasis identified, likely chronic. No consolidation or sizable pleural effusion. No pneumothorax. RECOMMENDATION: Further imaging is sought on clinical grounds, consider follow-up chest CT. LABS:  Results for orders placed or performed during the hospital encounter of 25/30/52   Basic Metabolic Panel   Result Value Ref Range    Glucose 99 70 - 99 mg/dL    BUN 22 8 - 23 mg/dL    Creatinine 0.83 0.50 - 0.90 mg/dL    Est, Glom Filt Rate >60 >60 mL/min/1.73m2    Calcium 10.1 8.6 - 10.4 mg/dL    Sodium 136 135 - 144 mmol/L    Potassium 3.7 3.7 - 5.3 mmol/L    Chloride 97 (L) 98 - 107 mmol/L    CO2 27 20 - 31 mmol/L    Anion Gap 12 9 - 17 mmol/L   Brain Natriuretic Peptide   Result Value Ref Range    Pro- (H) <300 pg/mL   CBC with Auto Differential   Result Value Ref Range    WBC 11.2 (H) 3.5 - 11.0 k/uL    RBC 4.49 4.0 - 5.2 m/uL    Hemoglobin 13.8 12.0 - 16.0 g/dL    Hematocrit 42.0 36 - 46 %    MCV 93.5 80 - 100 fL    MCH 30.8 26 - 34 pg    MCHC 32.9 31 - 37 g/dL    RDW 14.9 12.5 - 15.4 %    Platelets Platelet clumps present, count appears adequate.  140 - 450 k/uL    Seg Neutrophils 61 36 - 66 %    Lymphocytes 12 (L) 24 - 44 %    Monocytes 8 (H) 1 - 7 %    Eosinophils % 19 (H) 1 - 4 %    Basophils 0 0 - 2 %    Segs Absolute 6.83 1.8 - 7.7 k/uL    Absolute Lymph # 1.34 1.0 - 4.8 k/uL Absolute Mono # 0.90 (H) 0.1 - 0.8 k/uL    Absolute Eos # 2.13 (H) 0.0 - 0.4 k/uL    Basophils Absolute 0.00 0.0 - 0.2 k/uL    Morphology Normal    TSH   Result Value Ref Range    TSH 3.07 0.30 - 5.00 uIU/mL   Troponin   Result Value Ref Range    Troponin, High Sensitivity 11 0 - 14 ng/L   Urinalysis with Microscopic   Result Value Ref Range    Color, UA Yellow Yellow    Turbidity UA Clear Clear    Glucose, Ur NEGATIVE NEGATIVE    Bilirubin Urine NEGATIVE NEGATIVE    Ketones, Urine NEGATIVE NEGATIVE    Specific Gravity, UA 1.007 1.005 - 1.030    Urine Hgb NEGATIVE NEGATIVE    pH, UA 7.0 5.0 - 8.0    Protein, UA NEGATIVE NEGATIVE    Urobilinogen, Urine Normal Normal    Nitrite, Urine NEGATIVE NEGATIVE    Leukocyte Esterase, Urine NEGATIVE NEGATIVE    WBC, UA 0 TO 2 0 - 5 /HPF    RBC, UA 0 TO 2 0 - 2 /HPF    Epithelial Cells UA 2 TO 5 0 - 5 /HPF    Bacteria, UA FEW (A) None   Troponin   Result Value Ref Range    Troponin, High Sensitivity 12 0 - 14 ng/L       Troponin negative x 2  Elevated BNP    EMERGENCY DEPARTMENT COURSE:   Vitals:    Vitals:    02/21/23 1113 02/21/23 1118 02/21/23 1245 02/21/23 1441   BP:  137/85  (!) 167/81   Pulse:  93 93 86   Resp:  20 22 23   Temp:  97.5 °F (36.4 °C)     SpO2:  94% 96% 94%   Weight: 54.9 kg (121 lb)      Height: 5' 4\" (1.626 m)        -------------------------  BP: (!) 167/81, Temp: 97.5 °F (36.4 °C), Heart Rate: 86, Resp: 23    The patient was given the following medications:  Orders Placed This Encounter   Medications    hydrOXYzine pamoate (VISTARIL) 25 MG capsule     Sig: Take 1 capsule by mouth 2 times daily for 14 days     Dispense:  28 capsule     Refill:  0          RE-EVALUATION:  2:56 PM EST  The patient is resting comfortably. I updated the patient on test results and plan of care. The patient had an opportunity to ask questions, and all questions were answered. CONSULTS:  none    PROCEDURES:  None    FINAL IMPRESSION      1.  Rash and other nonspecific skin eruption    2.  Dyspnea, unspecified type          DISPOSITION/PLAN   DISPOSITION Decision To Discharge 02/21/2023 02:52:23 PM      CONDITION ON DISPOSITION:   Stable     PATIENT REFERRED TO:  Bre King, [address] [facility], [address] [place] [address],[address]  [address]  [phone]    Schedule an appointment as soon as possible for a visit in 3 days      Cuba Rabago MD  [address] [facility] [address] [facility] [address] [phone]    Schedule an appointment as soon as possible for a visit in 3 days      Dannielle Junior MD  [address] [facility]  [phone]    In 3 days      DISCHARGE MEDICATIONS:  New Prescriptions    HYDROXYZINE PAMOATE (VISTARIL) 25 MG CAPSULE    Take 1 capsule by mouth 2 times daily for 14 days       (Please note that portions of this note were completed with a voicerecognition program.  Efforts were made to edit the dictations but occasionally words are mis-transcribed.)    Elias Garcia MD  Attending Emergency Medicine Physician        Elias Garcia MD  02/21/23 7744

## 2023-02-22 LAB
EKG ATRIAL RATE: 82 BPM
EKG ATRIAL RATE: 86 BPM
EKG P AXIS: 89 DEGREES
EKG P AXIS: 90 DEGREES
EKG P-R INTERVAL: 214 MS
EKG P-R INTERVAL: 228 MS
EKG Q-T INTERVAL: 358 MS
EKG Q-T INTERVAL: 362 MS
EKG QRS DURATION: 76 MS
EKG QRS DURATION: 86 MS
EKG QTC CALCULATION (BAZETT): 422 MS
EKG QTC CALCULATION (BAZETT): 428 MS
EKG R AXIS: -2 DEGREES
EKG R AXIS: -3 DEGREES
EKG T AXIS: 46 DEGREES
EKG T AXIS: 56 DEGREES
EKG VENTRICULAR RATE: 82 BPM
EKG VENTRICULAR RATE: 86 BPM

## 2023-04-03 ENCOUNTER — APPOINTMENT (OUTPATIENT)
Dept: GENERAL RADIOLOGY | Age: 88
End: 2023-04-03
Payer: MEDICARE

## 2023-04-03 ENCOUNTER — APPOINTMENT (OUTPATIENT)
Dept: CT IMAGING | Age: 88
End: 2023-04-03
Payer: MEDICARE

## 2023-04-03 ENCOUNTER — HOSPITAL ENCOUNTER (EMERGENCY)
Age: 88
Discharge: HOME | End: 2023-04-03
Attending: EMERGENCY MEDICINE
Payer: MEDICARE

## 2023-04-03 VITALS
DIASTOLIC BLOOD PRESSURE: 70 MMHG | HEART RATE: 78 BPM | WEIGHT: 117 LBS | BODY MASS INDEX: 19.97 KG/M2 | OXYGEN SATURATION: 97 % | RESPIRATION RATE: 17 BRPM | TEMPERATURE: 97.7 F | SYSTOLIC BLOOD PRESSURE: 162 MMHG | HEIGHT: 64 IN

## 2023-04-03 DIAGNOSIS — S09.90XA INJURY OF HEAD, INITIAL ENCOUNTER: ICD-10-CM
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
## 2023-04-03 DIAGNOSIS — R42 DIZZINESS: Primary | ICD-10-CM

## 2023-04-03 LAB
ABSOLUTE EOS #: 0.57 K/UL (ref 0–0.4)
ABSOLUTE LYMPH #: 1.6 K/UL (ref 1–4.8)
ABSOLUTE MONO #: 0.62 K/UL (ref 0.1–1.2)
ANION GAP SERPL CALCULATED.3IONS-SCNC: 15 MMOL/L (ref 9–17)
BASOPHILS # BLD: 1 % (ref 0–2)
BASOPHILS ABSOLUTE: 0.05 K/UL (ref 0–0.2)
BILIRUBIN URINE: NEGATIVE
BUN SERPL-MCNC: 29 MG/DL (ref 8–23)
CALCIUM SERPL-MCNC: 10.4 MG/DL (ref 8.6–10.4)
CHLORIDE SERPL-SCNC: 95 MMOL/L (ref 98–107)
CO2 SERPL-SCNC: 26 MMOL/L (ref 20–31)
COLOR: YELLOW
CREAT SERPL-MCNC: 0.9 MG/DL (ref 0.5–0.9)
EOSINOPHILS RELATIVE PERCENT: 7 % (ref 1–4)
EPITHELIAL CELLS UA: ABNORMAL /HPF (ref 0–5)
GFR SERPL CREATININE-BSD FRML MDRD: >60 ML/MIN/1.73M2
GLUCOSE SERPL-MCNC: 95 MG/DL (ref 70–99)
GLUCOSE UR STRIP.AUTO-MCNC: NEGATIVE MG/DL
HCT VFR BLD AUTO: 45.9 % (ref 36–46)
HGB BLD-MCNC: 15.1 G/DL (ref 12–16)
KETONES UR STRIP.AUTO-MCNC: NEGATIVE MG/DL
LEUKOCYTE ESTERASE UR QL STRIP.AUTO: ABNORMAL
LYMPHOCYTES # BLD: 20 % (ref 24–44)
MCH RBC QN AUTO: 30.6 PG (ref 26–34)
MCHC RBC AUTO-ENTMCNC: 32.9 G/DL (ref 31–37)
MCV RBC AUTO: 93.1 FL (ref 80–100)
MONOCYTES # BLD: 8 % (ref 2–11)
NITRITE UR QL STRIP.AUTO: NEGATIVE
OTHER OBSERVATIONS UA: ABNORMAL
PDW BLD-RTO: 13.9 % (ref 12.5–15.4)
PLATELET # BLD AUTO: 282 K/UL (ref 140–450)
PMV BLD AUTO: 9.5 FL (ref 8–14)
POTASSIUM SERPL-SCNC: 3.9 MMOL/L (ref 3.7–5.3)
PROT UR STRIP.AUTO-MCNC: 7 MG/DL (ref 5–8)
PROT UR STRIP.AUTO-MCNC: NEGATIVE MG/DL
RBC # BLD: 4.93 M/UL (ref 4–5.2)
RBC CLUMPS #/AREA URNS AUTO: ABNORMAL /HPF (ref 0–2)
SEG NEUTROPHILS: 64 % (ref 36–66)
SEGMENTED NEUTROPHILS ABSOLUTE COUNT: 5.13 K/UL (ref 1.8–7.7)
SODIUM SERPL-SCNC: 136 MMOL/L (ref 135–144)
SPECIFIC GRAVITY UA: 1.01 (ref 1–1.03)
TROPONIN I SERPL DL<=0.01 NG/ML-MCNC: 7 NG/L (ref 0–14)
TURBIDITY: CLEAR
URINE HGB: NEGATIVE
UROBILINOGEN, URINE: NORMAL
WBC # BLD AUTO: 8 K/UL (ref 3.5–11)
WBC UA: ABNORMAL /HPF (ref 0–5)

## 2023-04-03 PROCEDURE — 36415 COLL VENOUS BLD VENIPUNCTURE: CPT

## 2023-04-03 PROCEDURE — 99285 EMERGENCY DEPT VISIT HI MDM: CPT

## 2023-04-03 PROCEDURE — 80048 BASIC METABOLIC PNL TOTAL CA: CPT

## 2023-04-03 PROCEDURE — 72125 CT NECK SPINE W/O DYE: CPT

## 2023-04-03 PROCEDURE — 84484 ASSAY OF TROPONIN QUANT: CPT

## 2023-04-03 PROCEDURE — 70450 CT HEAD/BRAIN W/O DYE: CPT

## 2023-04-03 PROCEDURE — 85025 COMPLETE CBC W/AUTO DIFF WBC: CPT

## 2023-04-03 PROCEDURE — 93005 ELECTROCARDIOGRAM TRACING: CPT | Performed by: EMERGENCY MEDICINE

## 2023-04-03 PROCEDURE — 71045 X-RAY EXAM CHEST 1 VIEW: CPT

## 2023-04-03 PROCEDURE — 81001 URINALYSIS AUTO W/SCOPE: CPT

## 2023-04-03 ASSESSMENT — PAIN - FUNCTIONAL ASSESSMENT: PAIN_FUNCTIONAL_ASSESSMENT: NONE - DENIES PAIN

## 2023-04-03 NOTE — DISCHARGE INSTRUCTIONS
Return immediately if any worsening symptoms or any other concerns    Tell us how we did visit: [email or web address]   and let us know about your experience

## 2023-04-03 NOTE — ED PROVIDER NOTES
otherwise documented above    LABS:  Results for orders placed or performed during the hospital encounter of 04/03/23   Troponin   Result Value Ref Range    Troponin, High Sensitivity 7 0 - 14 ng/L   CBC with Auto Differential   Result Value Ref Range    WBC 8.0 3.5 - 11.0 k/uL    RBC 4.93 4.0 - 5.2 m/uL    Hemoglobin 15.1 12.0 - 16.0 g/dL    Hematocrit 45.9 36 - 46 %    MCV 93.1 80 - 100 fL    MCH 30.6 26 - 34 pg    MCHC 32.9 31 - 37 g/dL    RDW 13.9 12.5 - 15.4 %    Platelets 776 323 - 399 k/uL    MPV 9.5 8.0 - 14.0 fL    Seg Neutrophils 64 36 - 66 %    Lymphocytes 20 (L) 24 - 44 %    Monocytes 8 2 - 11 %    Eosinophils % 7 (H) 1 - 4 %    Basophils 1 0 - 2 %    Segs Absolute 5.13 1.8 - 7.7 k/uL    Absolute Lymph # 1.60 1.0 - 4.8 k/uL    Absolute Mono # 0.62 0.1 - 1.2 k/uL    Absolute Eos # 0.57 (H) 0.0 - 0.4 k/uL    Basophils Absolute 0.05 0.0 - 0.2 k/uL   Basic Metabolic Panel   Result Value Ref Range    Glucose 95 70 - 99 mg/dL    BUN 29 (H) 8 - 23 mg/dL    Creatinine 0.90 0.50 - 0.90 mg/dL    Est, Glom Filt Rate >60 >60 mL/min/1.73m2    Calcium 10.4 8.6 - 10.4 mg/dL    Sodium 136 135 - 144 mmol/L    Potassium 3.9 3.7 - 5.3 mmol/L    Chloride 95 (L) 98 - 107 mmol/L    CO2 26 20 - 31 mmol/L    Anion Gap 15 9 - 17 mmol/L   Urinalysis with Reflex to Culture    Specimen: Urine, clean catch   Result Value Ref Range    Color, UA Yellow Yellow    Turbidity UA Clear Clear    Glucose, Ur NEGATIVE NEGATIVE    Bilirubin Urine NEGATIVE NEGATIVE    Ketones, Urine NEGATIVE NEGATIVE    Specific Gravity, UA 1.015 1.005 - 1.030    Urine Hgb NEGATIVE NEGATIVE    pH, UA 7.0 5.0 - 8.0    Protein, UA NEGATIVE NEGATIVE    Urobilinogen, Urine Normal Normal    Nitrite, Urine NEGATIVE NEGATIVE    Leukocyte Esterase, Urine SMALL (A) NEGATIVE   Microscopic Urinalysis   Result Value Ref Range    WBC, UA 5 TO 10 0 - 5 /HPF    RBC, UA 0 TO 2 0 - 2 /HPF    Epithelial Cells UA 5 TO 10 0 - 5 /HPF    Other Observations UA (A) NOT REQ.

## 2023-04-04 LAB
EKG ATRIAL RATE: 84 BPM
EKG P AXIS: 87 DEGREES
EKG P-R INTERVAL: 256 MS
EKG Q-T INTERVAL: 354 MS
EKG QRS DURATION: 84 MS
EKG QTC CALCULATION (BAZETT): 418 MS
EKG R AXIS: -27 DEGREES
EKG T AXIS: 57 DEGREES
EKG VENTRICULAR RATE: 84 BPM

## 2023-10-23 ENCOUNTER — APPOINTMENT (OUTPATIENT)
Dept: GENERAL RADIOLOGY | Age: 88
End: 2023-10-23
Payer: MEDICARE

## 2023-10-23 ENCOUNTER — HOSPITAL ENCOUNTER (EMERGENCY)
Age: 88
Discharge: HOME | End: 2023-10-23
Attending: EMERGENCY MEDICINE
Payer: MEDICARE

## 2023-10-23 VITALS
OXYGEN SATURATION: 96 % | BODY MASS INDEX: 19.46 KG/M2 | HEART RATE: 95 BPM | SYSTOLIC BLOOD PRESSURE: 151 MMHG | TEMPERATURE: 98.2 F | HEIGHT: 64 IN | WEIGHT: 114 LBS | RESPIRATION RATE: 16 BRPM | DIASTOLIC BLOOD PRESSURE: 92 MMHG

## 2023-10-23 DIAGNOSIS — I15.9 SECONDARY HYPERTENSION: ICD-10-CM

## 2023-10-23 DIAGNOSIS — L03.116 BILATERAL LOWER LEG CELLULITIS: Primary | ICD-10-CM

## 2023-10-23 DIAGNOSIS — M79.89 SWELLING OF BOTH LOWER EXTREMITIES: ICD-10-CM

## 2023-10-23 DIAGNOSIS — L03.115 BILATERAL LOWER LEG CELLULITIS: Primary | ICD-10-CM

## 2023-10-23 LAB
ANION GAP SERPL CALCULATED.3IONS-SCNC: 9 MMOL/L (ref 9–17)
BASOPHILS # BLD: 0 K/UL (ref 0–0.2)
BASOPHILS NFR BLD: 1 % (ref 0–2)
BNP SERPL-MCNC: 627 PG/ML
BUN SERPL-MCNC: 23 MG/DL (ref 8–23)
CALCIUM SERPL-MCNC: 9.8 MG/DL (ref 8.6–10.4)
CHLORIDE SERPL-SCNC: 93 MMOL/L (ref 98–107)
CO2 SERPL-SCNC: 31 MMOL/L (ref 20–31)
CREAT SERPL-MCNC: 1 MG/DL (ref 0.5–0.9)
EOSINOPHIL # BLD: 0.5 K/UL (ref 0–0.4)
EOSINOPHILS RELATIVE PERCENT: 6 % (ref 1–4)
ERYTHROCYTE [DISTWIDTH] IN BLOOD BY AUTOMATED COUNT: 14.8 % (ref 12.5–15.4)
GFR SERPL CREATININE-BSD FRML MDRD: 53 ML/MIN/1.73M2
GLUCOSE SERPL-MCNC: 178 MG/DL (ref 70–99)
HCT VFR BLD AUTO: 41.7 % (ref 36–46)
HGB BLD-MCNC: 14.3 G/DL (ref 12–16)
LYMPHOCYTES NFR BLD: 1.8 K/UL (ref 1–4.8)
LYMPHOCYTES RELATIVE PERCENT: 23 % (ref 24–44)
MCH RBC QN AUTO: 31.7 PG (ref 26–34)
MCHC RBC AUTO-ENTMCNC: 34.2 G/DL (ref 31–37)
MCV RBC AUTO: 92.7 FL (ref 80–100)
MONOCYTES NFR BLD: 0.6 K/UL (ref 0.1–1.2)
MONOCYTES NFR BLD: 7 % (ref 2–11)
NEUTROPHILS NFR BLD: 63 % (ref 36–66)
NEUTS SEG NFR BLD: 5.2 K/UL (ref 1.8–7.7)
PLATELET # BLD AUTO: 266 K/UL (ref 140–450)
PMV BLD AUTO: 7.7 FL (ref 6–12)
POTASSIUM SERPL-SCNC: 3.6 MMOL/L (ref 3.7–5.3)
RBC # BLD AUTO: 4.5 M/UL (ref 4–5.2)
SODIUM SERPL-SCNC: 133 MMOL/L (ref 135–144)
TROPONIN I SERPL HS-MCNC: 14 NG/L (ref 0–14)
WBC OTHER # BLD: 8.1 K/UL (ref 3.5–11)

## 2023-10-23 PROCEDURE — 84484 ASSAY OF TROPONIN QUANT: CPT

## 2023-10-23 PROCEDURE — 6370000000 HC RX 637 (ALT 250 FOR IP): Performed by: NURSE PRACTITIONER

## 2023-10-23 PROCEDURE — 36415 COLL VENOUS BLD VENIPUNCTURE: CPT

## 2023-10-23 PROCEDURE — 85025 COMPLETE CBC W/AUTO DIFF WBC: CPT

## 2023-10-23 PROCEDURE — 71045 X-RAY EXAM CHEST 1 VIEW: CPT

## 2023-10-23 PROCEDURE — 80048 BASIC METABOLIC PNL TOTAL CA: CPT

## 2023-10-23 PROCEDURE — 87040 BLOOD CULTURE FOR BACTERIA: CPT

## 2023-10-23 PROCEDURE — 99285 EMERGENCY DEPT VISIT HI MDM: CPT

## 2023-10-23 PROCEDURE — 93005 ELECTROCARDIOGRAM TRACING: CPT | Performed by: NURSE PRACTITIONER

## 2023-10-23 PROCEDURE — 83880 ASSAY OF NATRIURETIC PEPTIDE: CPT

## 2023-10-23 RX ORDER — CEPHALEXIN 250 MG/1
500 CAPSULE ORAL ONCE
Status: COMPLETED | OUTPATIENT
Start: 2023-10-23 | End: 2023-10-23

## 2023-10-23 RX ORDER — CEPHALEXIN 500 MG/1
500 CAPSULE ORAL 4 TIMES DAILY
Qty: 28 CAPSULE | Refills: 0 | Status: SHIPPED | OUTPATIENT
Start: 2023-10-23 | End: 2023-10-30

## 2023-10-23 RX ORDER — FUROSEMIDE 40 MG/1
40 TABLET ORAL SEE ADMIN INSTRUCTIONS
Qty: 3 TABLET | Refills: 0 | Status: SHIPPED | OUTPATIENT
Start: 2023-10-23

## 2023-10-23 RX ORDER — POTASSIUM CHLORIDE 20 MEQ/1
20 TABLET, EXTENDED RELEASE ORAL ONCE
Status: DISCONTINUED | OUTPATIENT
Start: 2023-10-23 | End: 2023-10-23

## 2023-10-23 RX ADMIN — CEPHALEXIN 500 MG: 250 CAPSULE ORAL at 21:27

## 2023-10-23 ASSESSMENT — PAIN - FUNCTIONAL ASSESSMENT
PAIN_FUNCTIONAL_ASSESSMENT: NONE - DENIES PAIN
PAIN_FUNCTIONAL_ASSESSMENT: NONE - DENIES PAIN

## 2023-10-24 LAB
EKG ATRIAL RATE: 87 BPM
EKG P AXIS: 90 DEGREES
EKG P-R INTERVAL: 248 MS
EKG Q-T INTERVAL: 358 MS
EKG QRS DURATION: 80 MS
EKG QTC CALCULATION (BAZETT): 430 MS
EKG R AXIS: -7 DEGREES
EKG T AXIS: 39 DEGREES
EKG VENTRICULAR RATE: 87 BPM

## 2023-10-24 ASSESSMENT — ENCOUNTER SYMPTOMS
SHORTNESS OF BREATH: 1
COUGH: 0

## 2023-10-24 NOTE — ED PROVIDER NOTES
[address] [facility] Emergency Department  [address] [address] [facility] [facility] [address]  Phone: [phone]  Fax: [phone]      Attending Physician Attestation    I performed a history and physical examination of the patient and discussed management with the mid level provideer. I reviewed the mid level provider's note and agree with the documented findings and plan of care. Any areas of disagreement are noted on the chart. I was personally present for the key portions of any procedures. I have documented in the chart those procedures where I was not present during the key portions. I have reviewed the emergency nurses triage note. I agree with the chief complaint, past medical history, past surgical history, allergies, medications, social and family history as documented unless otherwise noted below. Documentation of the HPI, Physical Exam and Medical Decision Making performed by mid level providers is based on my personal performance of the HPI, PE and MDM. For Physician Assistant/ Nurse Practitioner cases/documentation I have personally evaluated this patient and have completed at least one if not all key elements of the E/M (history, physical exam, and MDM). Additional findings are as noted. 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 SURGICAL HISTORY      has a past surgical history that includes Carotid endarterectomy (Right, 2004 or 2005); eye surgery (Bilateral); and pre-malignant / benign skin lesion excision (3/26/2013).     CURRENT MEDICATIONS
93 (L) 98 - 107 mmol/L    CO2 31 20 - 31 mmol/L    Anion Gap 9 9 - 17 mmol/L    Glucose 178 (H) 70 - 99 mg/dL    BUN 23 8 - 23 mg/dL    Creatinine 1.0 (H) 0.5 - 0.9 mg/dL    Est, Glom Filt Rate 53 (L) >60 mL/min/1.73m2    Calcium 9.8 8.6 - 10.4 mg/dL   Troponin   Result Value Ref Range    Troponin, High Sensitivity 14 0 - 14 ng/L   EKG 12 Lead   Result Value Ref Range    Ventricular Rate 87 BPM    Atrial Rate 87 BPM    P-R Interval 248 ms    QRS Duration 80 ms    Q-T Interval 358 ms    QTc Calculation (Bazett) 430 ms    P Axis 90 degrees    R Axis -7 degrees    T Axis 39 degrees       EMERGENCY DEPARTMENT COURSE:  Patient is alert nontoxic. Referred by PCP. Hypertensive in triage. She will admit to ongoing shortness of breath or chest pain. Will initiate cardiac work-up. We will add on BNP. Patient also has concern for bilateral lower extremity cellulitis. Lower legs are edematous. We will do blood cultures in case of pneumonia as I do have isolated adventitious sounds in the right posterior base. Troponin is 14 without acute changes on EKG. proBNP 627. No elevated white blood cell count. Glucose 178. Overall patient stable. Will review case with ED physician. Patient would prefer to go home unless absolutely necessary to be admitted. At this point I think it is reasonable for her to go home as she is stable and has a responsive physician. She has a very supportive neighbor who is helping her. I did talk to her about Lasix at length. I did explain to her why it is being given and to elevate legs whenever resting. She understands for concern for infection in lower extremities. We will give first dose of Keflex here. I did talk to her about how to take Keflex properly at home. She should follow closely with her family provider to ensure her symptoms are improving. She will likely need further outpatient work-up depending on how aggressive she wants to pursue health issues.   Patient understands

## 2023-10-24 NOTE — DISCHARGE INSTRUCTIONS
Please call the doctor's office and advise that we would like him to follow back up in the office. Elevate legs whenever resting. Take the Lasix for the next 3 mornings. It will make you urinate a lot. Take the antibiotics 4 times a day for 1 week until gone.   If you are having any worsening symptoms or new concerns return immediately to the emergency room

## 2023-10-28 LAB
MICROORGANISM SPEC CULT: NORMAL
MICROORGANISM SPEC CULT: NORMAL
SERVICE CMNT-IMP: NORMAL
SERVICE CMNT-IMP: NORMAL
SPECIMEN DESCRIPTION: NORMAL
SPECIMEN DESCRIPTION: NORMAL

## 2023-11-24 ENCOUNTER — HOSPITAL ENCOUNTER (EMERGENCY)
Age: 88
Discharge: HOME | End: 2023-11-24
Attending: EMERGENCY MEDICINE
Payer: MEDICARE

## 2023-11-24 ENCOUNTER — APPOINTMENT (OUTPATIENT)
Dept: CT IMAGING | Age: 88
End: 2023-11-24
Payer: MEDICARE

## 2023-11-24 ENCOUNTER — APPOINTMENT (OUTPATIENT)
Dept: GENERAL RADIOLOGY | Age: 88
End: 2023-11-24
Payer: MEDICARE

## 2023-11-24 VITALS
RESPIRATION RATE: 28 BRPM | DIASTOLIC BLOOD PRESSURE: 66 MMHG | BODY MASS INDEX: 18.44 KG/M2 | TEMPERATURE: 97.9 F | HEIGHT: 64 IN | HEART RATE: 65 BPM | OXYGEN SATURATION: 93 % | WEIGHT: 108 LBS | SYSTOLIC BLOOD PRESSURE: 150 MMHG

## 2023-11-24 DIAGNOSIS — R55 NEAR SYNCOPE: Primary | ICD-10-CM

## 2023-11-24 DIAGNOSIS — R60.9 PERIPHERAL EDEMA: ICD-10-CM

## 2023-11-24 LAB
ANION GAP SERPL CALCULATED.3IONS-SCNC: 8 MMOL/L (ref 9–17)
BACTERIA URNS QL MICRO: ABNORMAL
BASOPHILS # BLD: 0.1 K/UL (ref 0–0.2)
BASOPHILS NFR BLD: 1 % (ref 0–2)
BILIRUB UR QL STRIP: NEGATIVE
BNP SERPL-MCNC: 530 PG/ML
BUN SERPL-MCNC: 27 MG/DL (ref 8–23)
CALCIUM SERPL-MCNC: 9.6 MG/DL (ref 8.6–10.4)
CASTS #/AREA URNS LPF: ABNORMAL /LPF
CASTS #/AREA URNS LPF: ABNORMAL /LPF
CHARACTER UR: ABNORMAL
CHLORIDE SERPL-SCNC: 92 MMOL/L (ref 98–107)
CLARITY UR: CLEAR
CO2 SERPL-SCNC: 31 MMOL/L (ref 20–31)
COLOR UR: YELLOW
CREAT SERPL-MCNC: 1 MG/DL (ref 0.5–0.9)
EKG ATRIAL RATE: 80 BPM
EKG P AXIS: 90 DEGREES
EKG P-R INTERVAL: 256 MS
EKG Q-T INTERVAL: 360 MS
EKG QRS DURATION: 82 MS
EKG QTC CALCULATION (BAZETT): 415 MS
EKG R AXIS: 31 DEGREES
EKG T AXIS: 46 DEGREES
EKG VENTRICULAR RATE: 80 BPM
EOSINOPHIL # BLD: 0.4 K/UL (ref 0–0.4)
EOSINOPHILS RELATIVE PERCENT: 6 % (ref 1–4)
EPI CELLS #/AREA URNS HPF: ABNORMAL /HPF (ref 0–5)
ERYTHROCYTE [DISTWIDTH] IN BLOOD BY AUTOMATED COUNT: 14.2 % (ref 12.5–15.4)
GFR SERPL CREATININE-BSD FRML MDRD: 53 ML/MIN/1.73M2
GLUCOSE SERPL-MCNC: 136 MG/DL (ref 70–99)
GLUCOSE UR STRIP-MCNC: NEGATIVE MG/DL
HCT VFR BLD AUTO: 40.2 % (ref 36–46)
HGB BLD-MCNC: 13.5 G/DL (ref 12–16)
HGB UR QL STRIP.AUTO: NEGATIVE
KETONES UR STRIP-MCNC: NEGATIVE MG/DL
LEUKOCYTE ESTERASE UR QL STRIP: ABNORMAL
LYMPHOCYTES NFR BLD: 1.2 K/UL (ref 1–4.8)
LYMPHOCYTES RELATIVE PERCENT: 20 % (ref 24–44)
MCH RBC QN AUTO: 31.3 PG (ref 26–34)
MCHC RBC AUTO-ENTMCNC: 33.5 G/DL (ref 31–37)
MCV RBC AUTO: 93.5 FL (ref 80–100)
MONOCYTES NFR BLD: 0.4 K/UL (ref 0.1–1.2)
MONOCYTES NFR BLD: 7 % (ref 2–11)
NEUTROPHILS NFR BLD: 66 % (ref 36–66)
NEUTS SEG NFR BLD: 3.9 K/UL (ref 1.8–7.7)
NITRITE UR QL STRIP: NEGATIVE
PH UR STRIP: 6.5 [PH] (ref 5–8)
PLATELET # BLD AUTO: 265 K/UL (ref 140–450)
PMV BLD AUTO: 7.5 FL (ref 6–12)
POTASSIUM SERPL-SCNC: 3.7 MMOL/L (ref 3.7–5.3)
PROT UR STRIP-MCNC: NEGATIVE MG/DL
RBC # BLD AUTO: 4.31 M/UL (ref 4–5.2)
RBC #/AREA URNS HPF: ABNORMAL /HPF (ref 0–2)
SODIUM SERPL-SCNC: 131 MMOL/L (ref 135–144)
SP GR UR STRIP: 1.01 (ref 1–1.03)
TROPONIN I SERPL HS-MCNC: 10 NG/L (ref 0–14)
UROBILINOGEN UR STRIP-ACNC: NORMAL EU/DL (ref 0–1)
WBC #/AREA URNS HPF: ABNORMAL /HPF (ref 0–5)
WBC OTHER # BLD: 6 K/UL (ref 3.5–11)

## 2023-11-24 PROCEDURE — 99285 EMERGENCY DEPT VISIT HI MDM: CPT

## 2023-11-24 PROCEDURE — 36415 COLL VENOUS BLD VENIPUNCTURE: CPT

## 2023-11-24 PROCEDURE — 84484 ASSAY OF TROPONIN QUANT: CPT

## 2023-11-24 PROCEDURE — 71046 X-RAY EXAM CHEST 2 VIEWS: CPT

## 2023-11-24 PROCEDURE — 81001 URINALYSIS AUTO W/SCOPE: CPT

## 2023-11-24 PROCEDURE — 70450 CT HEAD/BRAIN W/O DYE: CPT

## 2023-11-24 PROCEDURE — 85025 COMPLETE CBC W/AUTO DIFF WBC: CPT

## 2023-11-24 PROCEDURE — 83880 ASSAY OF NATRIURETIC PEPTIDE: CPT

## 2023-11-24 PROCEDURE — 80048 BASIC METABOLIC PNL TOTAL CA: CPT

## 2023-11-24 PROCEDURE — 93005 ELECTROCARDIOGRAM TRACING: CPT | Performed by: NURSE PRACTITIONER

## 2023-11-24 RX ORDER — CEPHALEXIN 500 MG/1
500 CAPSULE ORAL 3 TIMES DAILY
Qty: 21 CAPSULE | Refills: 0 | Status: SHIPPED | OUTPATIENT
Start: 2023-11-24 | End: 2023-12-01

## 2023-11-24 ASSESSMENT — ENCOUNTER SYMPTOMS
SINUS PAIN: 0
NAUSEA: 0
VOMITING: 0
SORE THROAT: 0
DIARRHEA: 0
ABDOMINAL PAIN: 0
SHORTNESS OF BREATH: 0
COUGH: 0

## 2023-11-24 ASSESSMENT — PAIN - FUNCTIONAL ASSESSMENT: PAIN_FUNCTIONAL_ASSESSMENT: NONE - DENIES PAIN

## 2023-11-24 NOTE — DISCHARGE INSTRUCTIONS
Take the Keflex as prescribed. Follow-up with your primary care physician. Vascular information provided regarding peripheral edema. Return to the ER with any new or worsening symptoms.

## 2023-12-01 ENCOUNTER — TRANSCRIBE ORDERS (OUTPATIENT)
Dept: ADMINISTRATIVE | Age: 88
End: 2023-12-01

## 2023-12-01 DIAGNOSIS — R60.0 LOCALIZED EDEMA: Primary | ICD-10-CM

## 2023-12-05 ENCOUNTER — HOSPITAL ENCOUNTER (OUTPATIENT)
Age: 88
Discharge: HOME | End: 2023-12-05
Payer: MEDICARE

## 2023-12-05 ENCOUNTER — HOSPITAL ENCOUNTER (OUTPATIENT)
Dept: VASCULAR LAB | Age: 88
Discharge: HOME | End: 2023-12-07
Payer: MEDICARE

## 2023-12-05 DIAGNOSIS — R60.0 LOCALIZED EDEMA: ICD-10-CM

## 2023-12-05 PROCEDURE — 84443 ASSAY THYROID STIM HORMONE: CPT

## 2023-12-05 PROCEDURE — 93971 EXTREMITY STUDY: CPT

## 2023-12-05 PROCEDURE — 36415 COLL VENOUS BLD VENIPUNCTURE: CPT

## 2023-12-05 PROCEDURE — 84439 ASSAY OF FREE THYROXINE: CPT

## 2023-12-05 PROCEDURE — 80048 BASIC METABOLIC PNL TOTAL CA: CPT

## 2023-12-05 PROCEDURE — 93971 EXTREMITY STUDY: CPT | Performed by: SURGERY

## 2023-12-06 LAB
ANION GAP SERPL CALCULATED.3IONS-SCNC: 11 MMOL/L (ref 9–16)
BUN SERPL-MCNC: 38 MG/DL (ref 8–23)
CALCIUM SERPL-MCNC: 10 MG/DL (ref 8.6–10.4)
CHLORIDE SERPL-SCNC: 95 MMOL/L (ref 98–107)
CO2 SERPL-SCNC: 30 MMOL/L (ref 20–31)
CREAT SERPL-MCNC: 1 MG/DL (ref 0.5–0.9)
GFR SERPL CREATININE-BSD FRML MDRD: 52 ML/MIN/1.73M2
GLUCOSE SERPL-MCNC: 78 MG/DL (ref 74–99)
POTASSIUM SERPL-SCNC: 4.1 MMOL/L (ref 3.7–5.3)
SODIUM SERPL-SCNC: 136 MMOL/L (ref 136–145)
T4 FREE SERPL-MCNC: 1.6 NG/DL (ref 0.93–1.7)
TSH SERPL DL<=0.05 MIU/L-ACNC: 8.92 UIU/ML (ref 0.27–4.2)

## 2024-02-18 ENCOUNTER — HOSPITAL ENCOUNTER (INPATIENT)
Age: 89
LOS: 4 days | Discharge: NURSING, REHAB OR LONG-TERM CARE | End: 2024-02-22
Attending: EMERGENCY MEDICINE | Admitting: HOSPITALIST
Payer: MEDICARE

## 2024-02-18 ENCOUNTER — APPOINTMENT (OUTPATIENT)
Dept: GENERAL RADIOLOGY | Age: 89
End: 2024-02-18
Payer: MEDICARE

## 2024-02-18 DIAGNOSIS — E86.0 DEHYDRATION: Primary | ICD-10-CM

## 2024-02-18 DIAGNOSIS — R53.1 GENERALIZED WEAKNESS: ICD-10-CM

## 2024-02-18 DIAGNOSIS — U07.1 COVID-19: ICD-10-CM

## 2024-02-18 LAB
ALBUMIN SERPL-MCNC: 4 G/DL (ref 3.5–5.2)
ALBUMIN/GLOB SERPL: 0.9 {RATIO} (ref 1–2.5)
ALP SERPL-CCNC: 73 U/L (ref 35–104)
ALT SERPL-CCNC: 10 U/L (ref 5–33)
ANION GAP SERPL CALCULATED.3IONS-SCNC: 11 MMOL/L (ref 9–17)
AST SERPL-CCNC: 20 U/L
BACTERIA URNS QL MICRO: ABNORMAL
BASOPHILS # BLD: 0 K/UL (ref 0–0.2)
BASOPHILS NFR BLD: 0 % (ref 0–2)
BILIRUB SERPL-MCNC: 0.3 MG/DL (ref 0.3–1.2)
BILIRUB UR QL STRIP: NEGATIVE
BUN SERPL-MCNC: 25 MG/DL (ref 8–23)
CALCIUM SERPL-MCNC: 9.6 MG/DL (ref 8.6–10.4)
CHARACTER UR: ABNORMAL
CHLORIDE SERPL-SCNC: 91 MMOL/L (ref 98–107)
CLARITY UR: ABNORMAL
CO2 SERPL-SCNC: 28 MMOL/L (ref 20–31)
COLOR UR: YELLOW
CREAT SERPL-MCNC: 1.2 MG/DL (ref 0.5–0.9)
EOSINOPHIL # BLD: 0.2 K/UL (ref 0–0.4)
EOSINOPHILS RELATIVE PERCENT: 2 % (ref 1–4)
EPI CELLS #/AREA URNS HPF: ABNORMAL /HPF (ref 0–5)
ERYTHROCYTE [DISTWIDTH] IN BLOOD BY AUTOMATED COUNT: 14.7 % (ref 12.5–15.4)
FLUAV AG SPEC QL: NEGATIVE
FLUBV AG SPEC QL: NEGATIVE
GFR SERPL CREATININE-BSD FRML MDRD: 42 ML/MIN/1.73M2
GLUCOSE SERPL-MCNC: 107 MG/DL (ref 70–99)
GLUCOSE UR STRIP-MCNC: ABNORMAL MG/DL
HCT VFR BLD AUTO: 43.8 % (ref 36–46)
HGB BLD-MCNC: 14.7 G/DL (ref 12–16)
HGB UR QL STRIP.AUTO: NEGATIVE
KETONES UR STRIP-MCNC: NEGATIVE MG/DL
LEUKOCYTE ESTERASE UR QL STRIP: ABNORMAL
LYMPHOCYTES NFR BLD: 1.1 K/UL (ref 1–4.8)
LYMPHOCYTES RELATIVE PERCENT: 17 % (ref 24–44)
MCH RBC QN AUTO: 31.2 PG (ref 26–34)
MCHC RBC AUTO-ENTMCNC: 33.6 G/DL (ref 31–37)
MCV RBC AUTO: 93 FL (ref 80–100)
MONOCYTES NFR BLD: 0.7 K/UL (ref 0.1–1.2)
MONOCYTES NFR BLD: 10 % (ref 2–11)
NEUTROPHILS NFR BLD: 71 % (ref 36–66)
NEUTS SEG NFR BLD: 4.8 K/UL (ref 1.8–7.7)
NITRITE UR QL STRIP: NEGATIVE
PH UR STRIP: 6.5 [PH] (ref 5–8)
PLATELET # BLD AUTO: 269 K/UL (ref 140–450)
PMV BLD AUTO: 7.4 FL (ref 6–12)
POTASSIUM SERPL-SCNC: 4 MMOL/L (ref 3.7–5.3)
PROT SERPL-MCNC: 8.7 G/DL (ref 6.4–8.3)
PROT UR STRIP-MCNC: NEGATIVE MG/DL
RBC # BLD AUTO: 4.71 M/UL (ref 4–5.2)
RBC #/AREA URNS HPF: ABNORMAL /HPF (ref 0–2)
RSV BY PCR: NEGATIVE
SARS-COV-2 RDRP RESP QL NAA+PROBE: DETECTED
SODIUM SERPL-SCNC: 130 MMOL/L (ref 135–144)
SP GR UR STRIP: 1.01 (ref 1–1.03)
SPECIMEN DESCRIPTION: ABNORMAL
SPECIMEN SOURCE: NORMAL
UROBILINOGEN UR STRIP-ACNC: NORMAL EU/DL (ref 0–1)
WBC #/AREA URNS HPF: ABNORMAL /HPF (ref 0–5)
WBC OTHER # BLD: 6.8 K/UL (ref 3.5–11)

## 2024-02-18 PROCEDURE — 87804 INFLUENZA ASSAY W/OPTIC: CPT

## 2024-02-18 PROCEDURE — 81001 URINALYSIS AUTO W/SCOPE: CPT

## 2024-02-18 PROCEDURE — 85025 COMPLETE CBC W/AUTO DIFF WBC: CPT

## 2024-02-18 PROCEDURE — 1200000000 HC SEMI PRIVATE

## 2024-02-18 PROCEDURE — 2580000003 HC RX 258

## 2024-02-18 PROCEDURE — 71046 X-RAY EXAM CHEST 2 VIEWS: CPT

## 2024-02-18 PROCEDURE — 87635 SARS-COV-2 COVID-19 AMP PRB: CPT

## 2024-02-18 PROCEDURE — 99285 EMERGENCY DEPT VISIT HI MDM: CPT

## 2024-02-18 PROCEDURE — 87798 DETECT AGENT NOS DNA AMP: CPT

## 2024-02-18 PROCEDURE — 80053 COMPREHEN METABOLIC PANEL: CPT

## 2024-02-18 PROCEDURE — 6360000002 HC RX W HCPCS: Performed by: NURSE PRACTITIONER

## 2024-02-18 PROCEDURE — 87086 URINE CULTURE/COLONY COUNT: CPT

## 2024-02-18 PROCEDURE — 36415 COLL VENOUS BLD VENIPUNCTURE: CPT

## 2024-02-18 RX ORDER — 0.9 % SODIUM CHLORIDE 0.9 %
1000 INTRAVENOUS SOLUTION INTRAVENOUS ONCE
Status: COMPLETED | OUTPATIENT
Start: 2024-02-18 | End: 2024-02-18

## 2024-02-18 RX ORDER — ONDANSETRON 4 MG/1
4 TABLET, ORALLY DISINTEGRATING ORAL EVERY 8 HOURS PRN
Status: DISCONTINUED | OUTPATIENT
Start: 2024-02-18 | End: 2024-02-22 | Stop reason: HOSPADM

## 2024-02-18 RX ORDER — ACETAMINOPHEN 325 MG/1
650 TABLET ORAL EVERY 6 HOURS PRN
Status: DISCONTINUED | OUTPATIENT
Start: 2024-02-18 | End: 2024-02-22 | Stop reason: HOSPADM

## 2024-02-18 RX ORDER — ACETAMINOPHEN 650 MG/1
650 SUPPOSITORY RECTAL EVERY 6 HOURS PRN
Status: DISCONTINUED | OUTPATIENT
Start: 2024-02-18 | End: 2024-02-22 | Stop reason: HOSPADM

## 2024-02-18 RX ORDER — HEPARIN SODIUM 5000 [USP'U]/ML
5000 INJECTION, SOLUTION INTRAVENOUS; SUBCUTANEOUS 2 TIMES DAILY
Status: DISCONTINUED | OUTPATIENT
Start: 2024-02-18 | End: 2024-02-22 | Stop reason: HOSPADM

## 2024-02-18 RX ORDER — SODIUM CHLORIDE 9 MG/ML
INJECTION, SOLUTION INTRAVENOUS PRN
Status: DISCONTINUED | OUTPATIENT
Start: 2024-02-18 | End: 2024-02-22 | Stop reason: HOSPADM

## 2024-02-18 RX ORDER — SODIUM CHLORIDE 0.9 % (FLUSH) 0.9 %
5-40 SYRINGE (ML) INJECTION PRN
Status: DISCONTINUED | OUTPATIENT
Start: 2024-02-18 | End: 2024-02-22 | Stop reason: HOSPADM

## 2024-02-18 RX ORDER — GUAIFENESIN/DEXTROMETHORPHAN 100-10MG/5
5 SYRUP ORAL EVERY 4 HOURS PRN
Status: DISCONTINUED | OUTPATIENT
Start: 2024-02-18 | End: 2024-02-22 | Stop reason: HOSPADM

## 2024-02-18 RX ORDER — POLYETHYLENE GLYCOL 3350 17 G/17G
17 POWDER, FOR SOLUTION ORAL DAILY PRN
Status: DISCONTINUED | OUTPATIENT
Start: 2024-02-18 | End: 2024-02-22 | Stop reason: HOSPADM

## 2024-02-18 RX ORDER — SODIUM CHLORIDE 0.9 % (FLUSH) 0.9 %
5-40 SYRINGE (ML) INJECTION EVERY 12 HOURS SCHEDULED
Status: DISCONTINUED | OUTPATIENT
Start: 2024-02-18 | End: 2024-02-22 | Stop reason: HOSPADM

## 2024-02-18 RX ORDER — ONDANSETRON 2 MG/ML
4 INJECTION INTRAMUSCULAR; INTRAVENOUS EVERY 6 HOURS PRN
Status: DISCONTINUED | OUTPATIENT
Start: 2024-02-18 | End: 2024-02-22 | Stop reason: HOSPADM

## 2024-02-18 RX ADMIN — SODIUM CHLORIDE 1000 ML: 9 INJECTION, SOLUTION INTRAVENOUS at 21:34

## 2024-02-18 RX ADMIN — HEPARIN SODIUM 5000 UNITS: 5000 INJECTION INTRAVENOUS; SUBCUTANEOUS at 23:27

## 2024-02-18 ASSESSMENT — PAIN - FUNCTIONAL ASSESSMENT: PAIN_FUNCTIONAL_ASSESSMENT: NONE - DENIES PAIN

## 2024-02-19 PROBLEM — N30.00 ACUTE CYSTITIS WITHOUT HEMATURIA: Status: ACTIVE | Noted: 2024-02-19

## 2024-02-19 LAB
25(OH)D3 SERPL-MCNC: 82.8 NG/ML
ALBUMIN SERPL-MCNC: 3.6 G/DL (ref 3.5–5.2)
ALBUMIN/GLOB SERPL: 0.9 {RATIO} (ref 1–2.5)
ALP SERPL-CCNC: 67 U/L (ref 35–104)
ALT SERPL-CCNC: 10 U/L (ref 5–33)
ANION GAP SERPL CALCULATED.3IONS-SCNC: 11 MMOL/L (ref 9–17)
AST SERPL-CCNC: 19 U/L
BASOPHILS # BLD: 0 K/UL (ref 0–0.2)
BASOPHILS NFR BLD: 0 % (ref 0–2)
BILIRUB SERPL-MCNC: 0.2 MG/DL (ref 0.3–1.2)
BUN SERPL-MCNC: 19 MG/DL (ref 8–23)
CALCIUM SERPL-MCNC: 9.2 MG/DL (ref 8.6–10.4)
CHLORIDE SERPL-SCNC: 95 MMOL/L (ref 98–107)
CO2 SERPL-SCNC: 29 MMOL/L (ref 20–31)
CREAT SERPL-MCNC: 1 MG/DL (ref 0.5–0.9)
CRP SERPL HS-MCNC: 23.4 MG/L (ref 0–5)
EOSINOPHIL # BLD: 0 K/UL (ref 0–0.4)
EOSINOPHILS RELATIVE PERCENT: 0 % (ref 1–4)
ERYTHROCYTE [DISTWIDTH] IN BLOOD BY AUTOMATED COUNT: 14.3 % (ref 12.5–15.4)
GFR SERPL CREATININE-BSD FRML MDRD: 53 ML/MIN/1.73M2
GLUCOSE SERPL-MCNC: 123 MG/DL (ref 70–99)
HCT VFR BLD AUTO: 43.2 % (ref 36–46)
HGB BLD-MCNC: 14.3 G/DL (ref 12–16)
LYMPHOCYTES NFR BLD: 0.7 K/UL (ref 1–4.8)
LYMPHOCYTES RELATIVE PERCENT: 9 % (ref 24–44)
MCH RBC QN AUTO: 31.1 PG (ref 26–34)
MCHC RBC AUTO-ENTMCNC: 33.2 G/DL (ref 31–37)
MCV RBC AUTO: 93.7 FL (ref 80–100)
MONOCYTES NFR BLD: 0.5 K/UL (ref 0.1–1.2)
MONOCYTES NFR BLD: 7 % (ref 2–11)
NEUTROPHILS NFR BLD: 84 % (ref 36–66)
NEUTS SEG NFR BLD: 6.7 K/UL (ref 1.8–7.7)
PLATELET # BLD AUTO: 237 K/UL (ref 140–450)
PMV BLD AUTO: 8 FL (ref 6–12)
POTASSIUM SERPL-SCNC: 3.9 MMOL/L (ref 3.7–5.3)
PROT SERPL-MCNC: 7.8 G/DL (ref 6.4–8.3)
RBC # BLD AUTO: 4.61 M/UL (ref 4–5.2)
SODIUM SERPL-SCNC: 135 MMOL/L (ref 135–144)
WBC OTHER # BLD: 8.1 K/UL (ref 3.5–11)

## 2024-02-19 PROCEDURE — 2580000003 HC RX 258: Performed by: NURSE PRACTITIONER

## 2024-02-19 PROCEDURE — 82306 VITAMIN D 25 HYDROXY: CPT

## 2024-02-19 PROCEDURE — 85025 COMPLETE CBC W/AUTO DIFF WBC: CPT

## 2024-02-19 PROCEDURE — 6360000002 HC RX W HCPCS: Performed by: NURSE PRACTITIONER

## 2024-02-19 PROCEDURE — 6370000000 HC RX 637 (ALT 250 FOR IP): Performed by: STUDENT IN AN ORGANIZED HEALTH CARE EDUCATION/TRAINING PROGRAM

## 2024-02-19 PROCEDURE — 86140 C-REACTIVE PROTEIN: CPT

## 2024-02-19 PROCEDURE — 6370000000 HC RX 637 (ALT 250 FOR IP): Performed by: NURSE PRACTITIONER

## 2024-02-19 PROCEDURE — 36415 COLL VENOUS BLD VENIPUNCTURE: CPT

## 2024-02-19 PROCEDURE — 80053 COMPREHEN METABOLIC PANEL: CPT

## 2024-02-19 PROCEDURE — 6360000002 HC RX W HCPCS

## 2024-02-19 PROCEDURE — 2580000003 HC RX 258

## 2024-02-19 PROCEDURE — 1200000000 HC SEMI PRIVATE

## 2024-02-19 PROCEDURE — 99222 1ST HOSP IP/OBS MODERATE 55: CPT | Performed by: STUDENT IN AN ORGANIZED HEALTH CARE EDUCATION/TRAINING PROGRAM

## 2024-02-19 RX ORDER — LEVOFLOXACIN 500 MG/1
500 TABLET, FILM COATED ORAL ONCE
Status: DISCONTINUED | OUTPATIENT
Start: 2024-02-19 | End: 2024-02-20

## 2024-02-19 RX ORDER — LISINOPRIL AND HYDROCHLOROTHIAZIDE 20; 12.5 MG/1; MG/1
1 TABLET ORAL DAILY
Status: DISCONTINUED | OUTPATIENT
Start: 2024-02-19 | End: 2024-02-19

## 2024-02-19 RX ORDER — ASPIRIN 81 MG/1
81 TABLET, CHEWABLE ORAL DAILY
Status: DISCONTINUED | OUTPATIENT
Start: 2024-02-19 | End: 2024-02-22 | Stop reason: HOSPADM

## 2024-02-19 RX ORDER — ROSUVASTATIN CALCIUM 20 MG/1
10 TABLET, COATED ORAL DAILY
Status: DISCONTINUED | OUTPATIENT
Start: 2024-02-19 | End: 2024-02-22 | Stop reason: HOSPADM

## 2024-02-19 RX ORDER — VITAMIN B COMPLEX
2000 TABLET ORAL 2 TIMES DAILY
Status: DISCONTINUED | OUTPATIENT
Start: 2024-02-19 | End: 2024-02-19

## 2024-02-19 RX ORDER — RANITIDINE 150 MG/1
150 TABLET ORAL 2 TIMES DAILY
Status: DISCONTINUED | OUTPATIENT
Start: 2024-02-19 | End: 2024-02-19

## 2024-02-19 RX ORDER — FUROSEMIDE 20 MG/1
40 TABLET ORAL SEE ADMIN INSTRUCTIONS
Status: DISCONTINUED | OUTPATIENT
Start: 2024-02-19 | End: 2024-02-19

## 2024-02-19 RX ORDER — FAMOTIDINE 20 MG/1
20 TABLET, FILM COATED ORAL DAILY
Status: DISCONTINUED | OUTPATIENT
Start: 2024-02-19 | End: 2024-02-22 | Stop reason: HOSPADM

## 2024-02-19 RX ORDER — AMLODIPINE BESYLATE 5 MG/1
2.5 TABLET ORAL DAILY
Status: DISCONTINUED | OUTPATIENT
Start: 2024-02-19 | End: 2024-02-22 | Stop reason: HOSPADM

## 2024-02-19 RX ORDER — LEVOFLOXACIN 500 MG/1
500 TABLET, FILM COATED ORAL DAILY
Status: DISCONTINUED | OUTPATIENT
Start: 2024-02-19 | End: 2024-02-19

## 2024-02-19 RX ORDER — METOPROLOL SUCCINATE 25 MG/1
25 TABLET, EXTENDED RELEASE ORAL DAILY
Status: DISCONTINUED | OUTPATIENT
Start: 2024-02-19 | End: 2024-02-22 | Stop reason: HOSPADM

## 2024-02-19 RX ORDER — LEVOTHYROXINE SODIUM 0.07 MG/1
75 TABLET ORAL DAILY
Status: DISCONTINUED | OUTPATIENT
Start: 2024-02-19 | End: 2024-02-22 | Stop reason: HOSPADM

## 2024-02-19 RX ORDER — MONTELUKAST SODIUM 10 MG/1
10 TABLET ORAL NIGHTLY
Status: DISCONTINUED | OUTPATIENT
Start: 2024-02-19 | End: 2024-02-22 | Stop reason: HOSPADM

## 2024-02-19 RX ORDER — LISINOPRIL 10 MG/1
20 TABLET ORAL DAILY
Status: DISCONTINUED | OUTPATIENT
Start: 2024-02-19 | End: 2024-02-19

## 2024-02-19 RX ORDER — LISINOPRIL 10 MG/1
20 TABLET ORAL DAILY
Status: DISCONTINUED | OUTPATIENT
Start: 2024-02-19 | End: 2024-02-22 | Stop reason: HOSPADM

## 2024-02-19 RX ORDER — AZELASTINE HYDROCHLORIDE 0.5 MG/ML
1 SOLUTION/ DROPS OPHTHALMIC DAILY
Status: DISCONTINUED | OUTPATIENT
Start: 2024-02-19 | End: 2024-02-19

## 2024-02-19 RX ORDER — LEVOFLOXACIN 250 MG/1
250 TABLET, FILM COATED ORAL DAILY
Status: DISCONTINUED | OUTPATIENT
Start: 2024-02-20 | End: 2024-02-20

## 2024-02-19 RX ORDER — FUROSEMIDE 20 MG/1
40 TABLET ORAL DAILY
Status: DISCONTINUED | OUTPATIENT
Start: 2024-02-19 | End: 2024-02-22 | Stop reason: HOSPADM

## 2024-02-19 RX ORDER — HYDROCHLOROTHIAZIDE 25 MG/1
12.5 TABLET ORAL DAILY
Status: DISCONTINUED | OUTPATIENT
Start: 2024-02-19 | End: 2024-02-22 | Stop reason: HOSPADM

## 2024-02-19 RX ORDER — FLUTICASONE PROPIONATE 50 MCG
1 SPRAY, SUSPENSION (ML) NASAL DAILY
Status: DISCONTINUED | OUTPATIENT
Start: 2024-02-19 | End: 2024-02-22 | Stop reason: HOSPADM

## 2024-02-19 RX ADMIN — HEPARIN SODIUM 5000 UNITS: 5000 INJECTION INTRAVENOUS; SUBCUTANEOUS at 22:51

## 2024-02-19 RX ADMIN — POLYETHYLENE GLYCOL 3350 17 G: 17 POWDER, FOR SOLUTION ORAL at 13:49

## 2024-02-19 RX ADMIN — ASPIRIN 81 MG 81 MG: 81 TABLET ORAL at 15:40

## 2024-02-19 RX ADMIN — MONTELUKAST 10 MG: 10 TABLET, FILM COATED ORAL at 22:51

## 2024-02-19 RX ADMIN — LISINOPRIL 20 MG: 10 TABLET ORAL at 15:40

## 2024-02-19 RX ADMIN — FAMOTIDINE 20 MG: 20 TABLET ORAL at 15:40

## 2024-02-19 RX ADMIN — CEFTRIAXONE SODIUM 1000 MG: 1 INJECTION, POWDER, FOR SOLUTION INTRAMUSCULAR; INTRAVENOUS at 02:13

## 2024-02-19 RX ADMIN — HYDROCHLOROTHIAZIDE 12.5 MG: 25 TABLET ORAL at 15:40

## 2024-02-19 RX ADMIN — SODIUM CHLORIDE, PRESERVATIVE FREE 10 ML: 5 INJECTION INTRAVENOUS at 22:51

## 2024-02-19 RX ADMIN — LEVOTHYROXINE SODIUM 75 MCG: 75 TABLET ORAL at 15:40

## 2024-02-19 RX ADMIN — ROSUVASTATIN CALCIUM 10 MG: 20 TABLET, FILM COATED ORAL at 15:40

## 2024-02-19 RX ADMIN — ACETAMINOPHEN 650 MG: 325 TABLET ORAL at 13:48

## 2024-02-19 RX ADMIN — METOPROLOL SUCCINATE 25 MG: 25 TABLET, EXTENDED RELEASE ORAL at 15:40

## 2024-02-19 RX ADMIN — HEPARIN SODIUM 5000 UNITS: 5000 INJECTION INTRAVENOUS; SUBCUTANEOUS at 09:39

## 2024-02-19 RX ADMIN — ACETAMINOPHEN 650 MG: 325 TABLET ORAL at 02:40

## 2024-02-19 RX ADMIN — SODIUM CHLORIDE, PRESERVATIVE FREE 10 ML: 5 INJECTION INTRAVENOUS at 09:40

## 2024-02-19 RX ADMIN — AMLODIPINE BESYLATE 2.5 MG: 5 TABLET ORAL at 15:41

## 2024-02-19 ASSESSMENT — ENCOUNTER SYMPTOMS
SHORTNESS OF BREATH: 1
COUGH: 1
NAUSEA: 1
CONSTIPATION: 0
VOMITING: 0
SORE THROAT: 1
ALLERGIC/IMMUNOLOGIC NEGATIVE: 1
DIARRHEA: 0

## 2024-02-19 ASSESSMENT — PAIN DESCRIPTION - LOCATION
LOCATION: BACK
LOCATION: BACK;HIP
LOCATION: BACK

## 2024-02-19 ASSESSMENT — PAIN SCALES - GENERAL
PAINLEVEL_OUTOF10: 8
PAINLEVEL_OUTOF10: 6

## 2024-02-19 ASSESSMENT — PAIN - FUNCTIONAL ASSESSMENT: PAIN_FUNCTIONAL_ASSESSMENT: 0-10

## 2024-02-19 NOTE — ED NOTES
Dr. Brantley at bedside to speak with pt about her code status. Dr. Brantley explained options and pt verbalized understanding of all options. Pt wishes to be DNRCC. Pt is A&Ox4. Paper signed by Dr. Brantley and placed in chart. Copy given to pt per request.

## 2024-02-19 NOTE — H&P
patient was afebrile, Tmax 99.7, vitals within normal range, not requiring any supplemental oxygen was placed on 2 L for comfort.  Lab work showed sodium 130 which improved to 133, creatinine 1.2 trended down to 1, glucose 107.  Elevated CRP 23.4.  Liver enzymes within normal range, no leukocytosis hemoglobin stable.  Flu a and B were negative rapid COVID tested positive.  UA concerning for UTI.  Chest x-ray suggestive of chronic lung changes.    Patient received 1 L fluid bolus in the ER and was admitted to medicine.      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    PRE-MALIGNANT / BENIGN SKIN LESION EXCISION  3/26/2013    Excision lesion of nose. Dr. JASON Paris        Medications Prior to Admission:     Prior to Admission medications    Medication Sig Start Date End Date Taking? Authorizing Provider   furosemide (LASIX) 40 MG tablet Take 1 tablet by mouth See Admin Instructions Take in Morning 10/23/23   Kayy Alvares APRN - CNP   triamcinolone (KENALOG) 0.1 % ointment Apply 1 application topically 2 times daily 1/31/23   Kenneth Hodge MD   fluocinonide (LIDEX) 0.05 % external solution Apply 1 application topically 2 times daily Apply topically 2 times daily , leave on scalp for 15-30 minutes, then rinse off    Kenneth Hodge MD   amLODIPine (NORVASC) 2.5 MG tablet amlodipine 2.5 mg tablet    Kenneth Hodge MD  - 450 k/uL    MPV 8.0 6.0 - 12.0 fL    Neutrophils % 84 (H) 36 - 66 %    Lymphocytes % 9 (L) 24 - 44 %    Monocytes % 7 2 - 11 %    Eosinophils % 0 (L) 1 - 4 %    Basophils % 0 0 - 2 %    Neutrophils Absolute 6.70 1.8 - 7.7 k/uL    Lymphocytes Absolute 0.70 (L) 1.0 - 4.8 k/uL    Monocytes Absolute 0.50 0.1 - 1.2 k/uL    Eosinophils Absolute 0.00 0.0 - 0.4 k/uL    Basophils Absolute 0.00 0.0 - 0.2 k/uL   Comprehensive Metabolic Panel w/ Reflex to MG    Collection Time: 02/19/24  6:06 AM   Result Value Ref Range    Sodium 135 135 - 144 mmol/L    Potassium 3.9 3.7 - 5.3 mmol/L    Chloride 95 (L) 98 - 107 mmol/L    CO2 29 20 - 31 mmol/L    Anion Gap 11 9 - 17 mmol/L    Glucose 123 (H) 70 - 99 mg/dL    BUN 19 8 - 23 mg/dL    Creatinine 1.0 (H) 0.5 - 0.9 mg/dL    Est, Glom Filt Rate 53 (L) >60 mL/min/1.73m2    Calcium 9.2 8.6 - 10.4 mg/dL    Total Protein 7.8 6.4 - 8.3 g/dL    Albumin 3.6 3.5 - 5.2 g/dL    Albumin/Globulin Ratio 0.9 (L) 1.0 - 2.5    Total Bilirubin 0.2 (L) 0.3 - 1.2 mg/dL    Alkaline Phosphatase 67 35 - 104 U/L    ALT 10 5 - 33 U/L    AST 19 <32 U/L   C-Reactive Protein    Collection Time: 02/19/24  6:06 AM   Result Value Ref Range    CRP 23.4 (H) 0.0 - 5.0 mg/L   Vitamin D 25 Hydroxy    Collection Time: 02/19/24  6:06 AM   Result Value Ref Range    Vit D, 25-Hydroxy 82.8 >29.9 ng/mL       Imaging/Diagnostics:  XR CHEST (2 VW)    Result Date: 2/18/2024  Chronic changes of the lungs without definite acute abnormality.       Assessment :      Hospital Problems             Last Modified POA    * (Principal) COVID 2/18/2024 Yes    Leukocytosis 2/19/2024 Yes    Atrial fibrillation (HCC) 2/19/2024 Yes    Chronic renal insufficiency, stage III (moderate) (HCC) 2/19/2024 Yes    Hypertension 2/19/2024 Yes    Acute cystitis without hematuria 2/19/2024 Yes       Plan:     COVID PNA: Patient admitted with fatigue, was found to be COVID-positive in the ER, not requiring any supplemental oxygen as of now.  Continue  supportive care.  History of atrial fibrillation, currently rate controlled, continue home medications.  UA concerning for UTI: Will start her on p.o. antibiotic.  Home medications resumed.  Had detailed discussion with the patient this morning in the ER along with RN, patient stated that she has opted for DNR and has a legal paperwork with her .  She verbalized understanding of all the different codes and opted for being comfortable.  Afterwards try to give a call to her daughter and discussed about the discussion I had with the patient and the CODE STATUS she has opted for.  Daughter stated that patient does have living well which is with her  and she has been secretive about that all her life.  Daughter is agreeable that what ever she has decided is appropriate.  CODE STATUS has been changed to DNR CCA.  Paperwork signed and handed over to RN.    Patient is admitted as inpatient status because of co-morbidities listed above, severity of signs and symptoms as outlined, requirement for current medical therapies and most importantly because of direct risk to patient if care not provided in a hospital setting.  Expected length of stay > 48 hours. Patient is admitted in the Progressive Unit/Step down    Medical Decision Making: Medium    Garry Brantley MD  2/19/2024  6:55 PM    Copy sent to Suzie Montilla MD

## 2024-02-19 NOTE — ED PROVIDER NOTES
(ZOFRAN) injection 4 mg    polyethylene glycol (GLYCOLAX) packet 17 g    OR Linked Order Group     acetaminophen (TYLENOL) tablet 650 mg     acetaminophen (TYLENOL) suppository 650 mg    guaiFENesin-dextromethorphan (ROBITUSSIN DM) 100-10 MG/5ML syrup 5 mL    cefTRIAXone (ROCEPHIN) 1,000 mg in sodium chloride 0.9 % 50 mL IVPB (mini-bag)     Order Specific Question:   Antimicrobial Indications     Answer:   Urinary Tract Infection       Controlled Substances Monitoring:      DIAGNOSTIC RESULTS / RE-EVALUATION     Radiology:   I directly visualized(with the attending physician) the following  images and reviewed the radiologist interpretations:  XR CHEST (2 VW)    Result Date: 2/18/2024  EXAMINATION: TWO XRAY VIEWS OF THE CHEST 2/18/2024 6:25 pm COMPARISON: 11/24/2023 HISTORY: Acute cough, body aches, and fever. FINDINGS: Patient is slightly rotated.  Stable cardiomediastinal contours.  Chronic changes throughout the lungs which are mildly hyperinflated.  No acute airspace disease.  No significant pleural effusion.  No pneumothorax. Osteopenia.     Chronic changes of the lungs without definite acute abnormality.     Holter monitor 24-48 hour    Result Date: 2/16/2024  Table formatting from the original result was not included. SYMPTOMS & FINDINGS: DURATION: 48 hours The peak heart rate was 152 bpm. The minimum heart rate was 56 bpm. The average heart rate was 95 bpm. CONCLUSIONS: The prevailing rhythm is Sinus.   There are occasional premature atrial contractions. There are occasional premature ventricular contractions. Supraventricular tachycardia: 2,121 run(s) with a peak rate of 157 bpm. Longest 12 beats.  Available strips appear to be nearly incessant runs of PAFutter vs PAT.   Ventricular tachycardia: 4 run(s) with a peak heart rate of 135 bpm. Longest 3 beats SA Node Dysfunction: Normal AV Node Dysfunction: Normal Symptoms recorded include shortness of breath, heart fluttering, fatigue. Symptoms correlate with  specified.    Discharge Medications:  New Prescriptions    No medications on file       RUBEN Peres CNP   Emergency Medicine Nurse Practitioner    (Please note that portions of this note were completed with a voice recognitionprogram.  Efforts were made to edit the dictations but occasionally words are mis-transcribed.)      Ricardo Norman APRN - CNP  02/19/24 0202

## 2024-02-20 PROBLEM — Z71.89 GOALS OF CARE, COUNSELING/DISCUSSION: Status: ACTIVE | Noted: 2024-02-20

## 2024-02-20 PROBLEM — Z51.5 ENCOUNTER FOR PALLIATIVE CARE: Status: ACTIVE | Noted: 2024-02-20

## 2024-02-20 PROBLEM — U07.1 COVID-19: Status: ACTIVE | Noted: 2024-02-20

## 2024-02-20 LAB
ANION GAP SERPL CALCULATED.3IONS-SCNC: 10 MMOL/L (ref 9–17)
BUN SERPL-MCNC: 17 MG/DL (ref 8–23)
CALCIUM SERPL-MCNC: 9.1 MG/DL (ref 8.6–10.4)
CHLORIDE SERPL-SCNC: 93 MMOL/L (ref 98–107)
CO2 SERPL-SCNC: 29 MMOL/L (ref 20–31)
CREAT SERPL-MCNC: 1 MG/DL (ref 0.5–0.9)
ERYTHROCYTE [DISTWIDTH] IN BLOOD BY AUTOMATED COUNT: 14.3 % (ref 12.5–15.4)
GFR SERPL CREATININE-BSD FRML MDRD: 53 ML/MIN/1.73M2
GLUCOSE SERPL-MCNC: 93 MG/DL (ref 70–99)
HCT VFR BLD AUTO: 42.7 % (ref 36–46)
HGB BLD-MCNC: 14.5 G/DL (ref 12–16)
MCH RBC QN AUTO: 31.4 PG (ref 26–34)
MCHC RBC AUTO-ENTMCNC: 33.9 G/DL (ref 31–37)
MCV RBC AUTO: 92.6 FL (ref 80–100)
MICROORGANISM SPEC CULT: NO GROWTH
PLATELET # BLD AUTO: 124 K/UL (ref 140–450)
PMV BLD AUTO: 8.3 FL (ref 6–12)
POTASSIUM SERPL-SCNC: 3.4 MMOL/L (ref 3.7–5.3)
RBC # BLD AUTO: 4.61 M/UL (ref 4–5.2)
SODIUM SERPL-SCNC: 132 MMOL/L (ref 135–144)
SPECIMEN DESCRIPTION: NORMAL
WBC OTHER # BLD: 9.5 K/UL (ref 3.5–11)

## 2024-02-20 PROCEDURE — 36415 COLL VENOUS BLD VENIPUNCTURE: CPT

## 2024-02-20 PROCEDURE — 6370000000 HC RX 637 (ALT 250 FOR IP): Performed by: STUDENT IN AN ORGANIZED HEALTH CARE EDUCATION/TRAINING PROGRAM

## 2024-02-20 PROCEDURE — 80048 BASIC METABOLIC PNL TOTAL CA: CPT

## 2024-02-20 PROCEDURE — 6360000002 HC RX W HCPCS: Performed by: NURSE PRACTITIONER

## 2024-02-20 PROCEDURE — 97535 SELF CARE MNGMENT TRAINING: CPT

## 2024-02-20 PROCEDURE — 99232 SBSQ HOSP IP/OBS MODERATE 35: CPT | Performed by: STUDENT IN AN ORGANIZED HEALTH CARE EDUCATION/TRAINING PROGRAM

## 2024-02-20 PROCEDURE — 97162 PT EVAL MOD COMPLEX 30 MIN: CPT

## 2024-02-20 PROCEDURE — 99222 1ST HOSP IP/OBS MODERATE 55: CPT

## 2024-02-20 PROCEDURE — 97166 OT EVAL MOD COMPLEX 45 MIN: CPT

## 2024-02-20 PROCEDURE — 85027 COMPLETE CBC AUTOMATED: CPT

## 2024-02-20 PROCEDURE — 6360000002 HC RX W HCPCS: Performed by: STUDENT IN AN ORGANIZED HEALTH CARE EDUCATION/TRAINING PROGRAM

## 2024-02-20 PROCEDURE — 2580000003 HC RX 258: Performed by: NURSE PRACTITIONER

## 2024-02-20 PROCEDURE — 97116 GAIT TRAINING THERAPY: CPT

## 2024-02-20 PROCEDURE — 1200000000 HC SEMI PRIVATE

## 2024-02-20 RX ORDER — QUETIAPINE FUMARATE 25 MG/1
25 TABLET, FILM COATED ORAL NIGHTLY
Status: DISCONTINUED | OUTPATIENT
Start: 2024-02-20 | End: 2024-02-20

## 2024-02-20 RX ORDER — QUETIAPINE FUMARATE 25 MG/1
12.5 TABLET, FILM COATED ORAL NIGHTLY
Status: DISCONTINUED | OUTPATIENT
Start: 2024-02-20 | End: 2024-02-22 | Stop reason: HOSPADM

## 2024-02-20 RX ORDER — DEXAMETHASONE 4 MG/1
6 TABLET ORAL DAILY
Status: DISCONTINUED | OUTPATIENT
Start: 2024-02-20 | End: 2024-02-21

## 2024-02-20 RX ADMIN — SODIUM CHLORIDE, PRESERVATIVE FREE 10 ML: 5 INJECTION INTRAVENOUS at 20:09

## 2024-02-20 RX ADMIN — ASPIRIN 81 MG 81 MG: 81 TABLET ORAL at 10:14

## 2024-02-20 RX ADMIN — FLUTICASONE PROPIONATE 1 SPRAY: 50 SPRAY, METERED NASAL at 10:27

## 2024-02-20 RX ADMIN — FAMOTIDINE 20 MG: 20 TABLET ORAL at 10:15

## 2024-02-20 RX ADMIN — HYDROCHLOROTHIAZIDE 12.5 MG: 25 TABLET ORAL at 10:15

## 2024-02-20 RX ADMIN — FUROSEMIDE 40 MG: 20 TABLET ORAL at 10:14

## 2024-02-20 RX ADMIN — HEPARIN SODIUM 5000 UNITS: 5000 INJECTION INTRAVENOUS; SUBCUTANEOUS at 20:09

## 2024-02-20 RX ADMIN — AMLODIPINE BESYLATE 2.5 MG: 5 TABLET ORAL at 10:14

## 2024-02-20 RX ADMIN — LEVOTHYROXINE SODIUM 75 MCG: 75 TABLET ORAL at 10:15

## 2024-02-20 RX ADMIN — QUETIAPINE FUMARATE 12.5 MG: 25 TABLET ORAL at 20:09

## 2024-02-20 RX ADMIN — HEPARIN SODIUM 5000 UNITS: 5000 INJECTION INTRAVENOUS; SUBCUTANEOUS at 10:15

## 2024-02-20 RX ADMIN — MONTELUKAST 10 MG: 10 TABLET, FILM COATED ORAL at 20:09

## 2024-02-20 RX ADMIN — DEXAMETHASONE 6 MG: 4 TABLET ORAL at 10:15

## 2024-02-20 RX ADMIN — LISINOPRIL 20 MG: 10 TABLET ORAL at 10:14

## 2024-02-20 RX ADMIN — METOPROLOL SUCCINATE 25 MG: 25 TABLET, EXTENDED RELEASE ORAL at 10:15

## 2024-02-20 RX ADMIN — SODIUM CHLORIDE, PRESERVATIVE FREE 5 ML: 5 INJECTION INTRAVENOUS at 10:22

## 2024-02-20 RX ADMIN — ROSUVASTATIN CALCIUM 10 MG: 20 TABLET, FILM COATED ORAL at 10:14

## 2024-02-20 NOTE — ACP (ADVANCE CARE PLANNING)
Advance Care Planning     Advance Care Planning (ACP) Physician/NP/PA Conversation    Date of Conversation: 2/18/2024  Conducted with: Patient     Healthcare Decision Maker:  Patients daughter Gissel Wheeler - [phone]        Care Preferences:    Hospitalization:  \"If your health worsens and it becomes clear that your chance of recovery is unlikely, what would be your preference regarding hospitalization?\"  Currently hospitalized     Ventilation:  \"If you were unable to breath on your own and your chance of recovery was unlikely, what would be your preference about the use of a ventilator (breathing machine) if it was available to you?\"  The patient would NOT desire the use of a ventilator.    Resuscitation:  \"In the event your heart stopped as a result of an underlying serious health condition, would you want attempts made to restart your heart, or would you prefer a natural death?\"  No, do NOT attempt to resuscitate.    treatment goals    Conversation Outcomes / Follow-Up Plan:  ACP in process - information provided, considering goals and options  Reviewed DNR/DNI and patient confirms current DNR status - completed forms on file (place new order if needed)    Length of Voluntary ACP Conversation in minutes:  <16 minutes (Non-Billable)    Ana Tripathi, APRN - CNP

## 2024-02-20 NOTE — PROGRESS NOTES
Pharmacy Note     Renal Dose Adjustment    Noemy Alcantar is a 89 y.o. female. Pharmacist assessment of renally cleared medications.    Recent Labs     02/18/24  1902 02/19/24  0606   BUN 25* 19       Recent Labs     02/18/24  1902 02/19/24  0606   CREATININE 1.2* 1.0*       Estimated Creatinine Clearance: 28 mL/min (A) (based on SCr of 1 mg/dL (H)).      Height:   Ht Readings from Last 1 Encounters:   02/18/24 1.6 m (5' 3\")     Weight:  Wt Readings from Last 1 Encounters:   02/18/24 49 kg (108 lb)       The following medication dose has been adjusted based upon renal function per P&T Guidelines:             Levaquin 500 mg daily changed to 500 mg x1 then 250 mg daily x7 days total    Coleen Maynard, PharmD 2/19/2024 7:19 PM

## 2024-02-20 NOTE — PLAN OF CARE
Problem: Discharge Planning  Goal: Discharge to home or other facility with appropriate resources  Outcome: Progressing  Flowsheets (Taken 2/20/2024 0830)  Discharge to home or other facility with appropriate resources:   Identify barriers to discharge with patient and caregiver   Arrange for needed discharge resources and transportation as appropriate   Identify discharge learning needs (meds, wound care, etc)   Arrange for interpreters to assist at discharge as needed   Refer to discharge planning if patient needs post-hospital services based on physician order or complex needs related to functional status, cognitive ability or social support system     Problem: Safety - Adult  Goal: Free from fall injury  Outcome: Progressing  Flowsheets (Taken 2/20/2024 0830)  Free From Fall Injury:   Instruct family/caregiver on patient safety   Based on caregiver fall risk screen, instruct family/caregiver to ask for assistance with transferring infant if caregiver noted to have fall risk factors     Problem: Pain  Goal: Verbalizes/displays adequate comfort level or baseline comfort level  Outcome: Progressing  Flowsheets (Taken 2/20/2024 0815)  Verbalizes/displays adequate comfort level or baseline comfort level:   Encourage patient to monitor pain and request assistance   Assess pain using appropriate pain scale   Administer analgesics based on type and severity of pain and evaluate response   Consider cultural and social influences on pain and pain management   Notify Licensed Independent Practitioner if interventions unsuccessful or patient reports new pain   Implement non-pharmacological measures as appropriate and evaluate response     Problem: ABCDS Injury Assessment  Goal: Absence of physical injury  Outcome: Progressing  Flowsheets (Taken 2/20/2024 0830)  Absence of Physical Injury: Implement safety measures based on patient assessment     Problem: Skin/Tissue Integrity  Goal: Absence of new skin

## 2024-02-20 NOTE — PROGRESS NOTES
Physical Therapy  Facility/Department: [address]  Physical Therapy Initial Assessment    Name: Noemy Alcantar  : 1932  MRN: [id number]  Date of Service: 2024    Chief Complaint   Patient presents with    Fatigue     Increased fatigue 'states just dont feel good\"      Discharge Recommendations:  Patient would benefit from continued therapy after discharge   PT Equipment Recommendations  Equipment Needed: No (rolling walker and 4ww at home)      Patient Diagnosis(es): The primary encounter diagnosis was Dehydration. Diagnoses of Generalized weakness and COVID-19 were also pertinent to this visit.  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  Past Surgical History:  has a past surgical history that includes Carotid endarterectomy (Right,  or 2005); eye surgery (Bilateral); and pre-malignant / benign skin lesion excision (3/26/2013).    Assessment   Body Structures, Functions, Activity Limitations Requiring Skilled Therapeutic Intervention: Decreased functional mobility ;Decreased safe awareness;Decreased cognition;Decreased balance;Decreased strength    Assessment: Pt presents with fatigue, (+) COVID and dehydration. Pt lives alone and was modified independent with various assistive devices in house. Pt currently requiring min assist for bed mobility, CGA transfers except min at toilet, pt ambulated 10ft and 55ft with rolling walker min x 1 for balance. Pt does not demonstrate adequate safety for return to prior independent living situation. Pt will benefit from continue skilled PT for strengthening, balance, safety and functional mobility training while in the hospital and at discharge.    Therapy Prognosis: Good    Decision Making: Medium  Verbalized understanding;Demonstrated understanding;Continued education needed      Therapy Time   Individual Concurrent Group Co-treatment   Time In 1324         Time Out 1358         Minutes 34         Timed Code Treatment Minutes: 10 Minutes       CORNEL GALDAMEZ, PT

## 2024-02-20 NOTE — PROGRESS NOTES
Pt exhibiting signs of hallucination. States she has a roommate and points across the room where no one is there. Dr. Saucedo notified, possible hospital delirium. Seroquel ordered, will continue to monitor.

## 2024-02-20 NOTE — PROGRESS NOTES
[facility]  Office: [phone]  Yaya Zambrano DO, Aris Lomax DO, Lito To DO, Deandre Webb DO, Bacilio Lee MD, Gavi Herndon MD, Rogelio Barnes MD, Sherron Jordan MD,  Chao Baker MD, Lyly Lyons MD, Luciana Way MD,  Elizabeth Dawn DO, Sommer Richards MD, Chip Saucedo MD, Rio Zambrano DO, Liz Burgess MD,  Donald Stanley DO, Liz Bush MD, Elana Chu MD, Dana Hawley MD, Festus Murdock MD,  Aiden Greco MD, Petey De León MD, Augusto Collins MD, Ale Ladd MD, Garry Brantley MD, Tra Cobian MD, Roberto Carlos Pena DO, Franklyn Palacios DO, Isabel Langley MD,  Navid Lewis MD, Shirley Waterhouse, CNP,  Valerie Gaytan, CNP, Seb Holguin, CNP,  Pat Longoria, RANDALL, Soha Wilson, CNP, Josefina Tanner, CNP, Cece Merlos CNP, Zeina Purcell CNP, Eveline Jackson, CNP, Sanaz Song, PA-C, Farheen Villalba PA-C, Pat Hodge, CNP, Adriana Lim, CNP, Sasha Ahuja, CNS, Gissel Heredia, CNP, Gabi Lujan CNP, Tracy Schwab, CNP         [facility]   IN-PATIENT SERVICE   [facility]    Progress Note    2/20/2024    11:11 AM    Name:   Noemy Alcantar  MRN:     [id number]     Acct:      [id number]   Room:   311/311-01   Day:  2  Admit Date:  2/18/2024  5:12 PM    PCP:   Suzie Varela MD  Code Status:  DNR-CC    Subjective:     Patient was seen and examined at bedside this AM. She reports feeling better overall but continues to endorse weakness. The patient has been weaned off of oxygen. Plan to continue current management with anticipated discharge tomorrow AM pending clinical improvement. The patient will likely require placement.     Medications:     Allergies:    Allergies   Allergen Reactions    Latex     Banana     Eggs Or Egg-Derived Products     Other Other (See Comments)     PERFUMES, ODORS, COLOGNES cause eyes to water  GRASS causes eyes to water, nose to get stuffy    Peanut-Containing Drug Products     Penicillins Swelling

## 2024-02-20 NOTE — PROGRESS NOTES
Occupational Therapy  Facility/Department: [address]  Occupational Therapy Initial Assessment    Name: Noemy Alcantar  : 1932  MRN: [id number]  Date of Service: 2024    Discharge Recommendations:  Patient would benefit from continued therapy after discharge  OT Equipment Recommendations  Other: TBD     Chief Complaint   Patient presents with    Fatigue     Increased fatigue 'states just dont feel good\"      Patient Diagnosis(es): The primary encounter diagnosis was Dehydration. Diagnoses of Generalized weakness and COVID-19 were also pertinent to this visit.  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  Past Surgical History:  has a past surgical history that includes Carotid endarterectomy (Right,  or ); eye surgery (Bilateral); and pre-malignant / benign skin lesion excision (3/26/2013).           Assessment   Performance deficits / Impairments: Decreased functional mobility ;Decreased ADL status;Decreased balance;Decreased strength;Decreased safe awareness  Assessment: Patient demonstrates decreased ADLs, balance, UE strength, safety and functional mobility following hospitalization due to fatigue, chills, cough, (+) COVID. Patient would benefit from skilled OT services addressing above deficits while here at hospital and following discharge to maximize independence and safety. Patient currently unsafe to return to prior living arrangements.  Prognosis: Good  Decision Making: Medium Complexity  REQUIRES OT FOLLOW-UP: Yes  Activity Tolerance  Activity Tolerance: Patient Tolerated treatment well        Plan   Occupational Therapy Plan  Times Per Week: 5-6x/wk  Current Treatment Recommendations: Balance training, Functional mobility training,  hearing/hearing concerns    Cognition  Overall Cognitive Status: Exceptions  Arousal/Alertness: Delayed responses to stimuli  Following Commands: Follows one step commands consistently;Follows multistep commands with increased time;Follows multistep commands with repitition  Attention Span: Attends with cues to redirect  Memory: Decreased recall of recent events  Safety Judgement: Decreased awareness of need for assistance;Decreased awareness of need for safety  Problem Solving: Assistance required to correct errors made;Decreased awareness of errors;Assistance required to implement solutions  Insights: Decreased awareness of deficits  Initiation: Requires cues for some  Sequencing: Requires cues for some  Orientation  Overall Orientation Status: Impaired (Pt disoriented in room. Thought images in mirror of therapists were her daughter. Pt said \"hi\" to man pictured on TV)  Orientation Level: Oriented X4                  Education Given To: Patient  Education Provided: Role of Therapy;Plan of Care;ADL Adaptive Strategies;Transfer Training;Fall Prevention Strategies;Equipment  Education Method: Verbal;Demonstration  Barriers to Learning: Cognition  Education Outcome: Verbalized understanding;Continued education needed           Hand Dominance  Hand Dominance: Right  Hand Assessment Comment  Hand Assessment Comment: B  strength fair         AM-PAC - ADL  AM-PAC Daily Activity - Inpatient   How much help is needed for putting on and taking off regular lower body clothing?: A Lot  How much help is needed for bathing (which includes washing, rinsing, drying)?: A Little  How much help is needed for toileting (which includes using toilet, bedpan, or urinal)?: A Little  How much help is needed for putting on and taking off regular upper body clothing?: A Little  How much help is needed for taking care of personal grooming?: A Little  How much help for eating meals?: None  AM-[facility] Inpatient Daily Activity Raw Score:

## 2024-02-20 NOTE — PROGRESS NOTES
SPIRITUAL CARE DEPARTMENT - [facility]  PROGRESS NOTE    Room # 311/311-01   Name: Noemy Alcantar            [employer]: [employer]      Reason for visit: Routine    I visited the patient and family    Admit Date & Time: 2/18/2024  5:12 PM    Assessment:  Noemy Alcantar is a 89 y.o. female in the hospital because \"COVID\". Upon entering the room pt was sitting in chair. Pt's daughter walked into room for a few minutes and left while this writer was present. Patient shared about her current medical challenges. Pt also shared about unique family circumstances. Patient also shared about the loss of all her immediate family members whom she is still grieving the loss of.       Intervention:  I introduced myself and my title as  I offered space for patient  to express feelings, needs, and concerns and provided a ministry presence. Writer actively listened to patient and validated pt's feelings. Writer also offered words of affirmation. Writer also assured pt of his prayers for pt.     Outcome:  Pt expressed gratitude for this  visit     Plan:  Chaplains will remain available to offer spiritual and emotional support as needed.    Electronically signed by Chaplain Scottie, on 2/20/2024 at 3:53 PM.  Spiritual Care Department  [facility]        02/20/24 1551   Encounter Summary   Encounter Overview/Reason  Initial Encounter   Service Provided For: Patient and family together   Referral/Consult From: Palliative Care   Support System Children;Family members   Last Encounter  02/20/24   Complexity of Encounter Moderate   Begin Time 1500   End Time  1550   Total Time Calculated 50 min   Spiritual/Emotional needs   Type Spiritual Support   Assessment/Intervention/Outcome   Assessment Tearful;Sad;Coping;Calm   Intervention Active listening;Sustaining Presence/Ministry of presence;Explored/Affirmed feelings, thoughts, concerns;Nurtured Hope;Life review/Legacy   Outcome Engaged in  conversation;Expressed Gratitude;Expressed feelings, needs, and concerns;Receptive   Plan and Referrals   Plan/Referrals Continue Support (comment)        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 Poor prognosis. Palliative met with family.  - c/w fentanyl patch  - c/w morphine 4mg IV q4 PRN for severe pain  - c/w home gabapentin 100 mg PO BID  - Brain MRI: poor quality, no signs of additional mets, no sign of swelling.   - Spine MRI: 1.0 cm intradural mass at T12, may be metastases. Additional bony metastases of L1 and multiple intramuscular mets within the lumbar spine. Pt w/ metastatic melanoma, with poor prognosis. Palliative consulted. Family understands meaning of prognosis but wishes to continue workup , as per Palliative.  - c/w pain control with morphine, fentanyl patch, lidoderm, gabapentin  - Given that AMS most likely 2/2 metastasis Pt oncologist at [facility], Pt pending transfer to [facility] (accepting physician Dr. Landaverde)--awaiting bed

## 2024-02-20 NOTE — CONSULTS
Palliative Care Inpatient Consult    NAME:  Noemy Alcantar  MEDICAL RECORD NUMBER:  [id number]  AGE: 89 y.o.   GENDER: female  : 1932  TODAY'S DATE:  2024    Reasons for Consultation:    Symptom and/or pain management  Provision of information regarding PC and/or hospice philosophies  Complex, time-intensive communication and interdisciplinary psychosocial support  Clarification of goals of care and/or assistance with difficult decision-making  Guidance in regards to resources and transition(s)    Members of PC team contributing to this consultation are : Ana Tripathi, Palliative Care APRN-CNP    Plan      Palliative Interaction: Patient seen and examined on rounds. Patient resting comfortably in bed. She endorses feeling much better, however she notes she continues to have fatigue and weakness.     Introduce myself and the palliative role in her care. Patient shares she lives at home, and is able to do her normal ADLs independently. She does note she could use extra help at home and would be interested in someone coming to her home.     We discuss her goals at length, including code status as patient changed to DNR-CC this admission. Make sure patient has a full understanding of this. Patient expresses full understanding and reiterates her wishes. These are consistent with a DNR-CC code status.     We discuss Hospice, patient feels she is not ready for Hospice services at this time. This is not unreasonable given her level of independence and quality at home. Do believe patient could benefit from additional aide services in the home or assisted living for general help.     Patient has completed living will and POA paperwork on file. This names her daughter Gissel as her primary decision maker with her son Braxton as an alternative.     Palliative to follow patients progress while she remains inpatient, however more peripherally at this time.       Principle Problem/Diagnosis:  COVID    Additional

## 2024-02-20 NOTE — PLAN OF CARE
Problem: Discharge Planning  Goal: Discharge to home or other facility with appropriate resources  Outcome: Progressing     Problem: Safety - Adult  Goal: Free from fall injury  Outcome: Progressing     Problem: Pain  Goal: Verbalizes/displays adequate comfort level or baseline comfort level  Outcome: Progressing     Problem: ABCDS Injury Assessment  Goal: Absence of physical injury  Outcome: Progressing     Problem: Skin/Tissue Integrity  Goal: Absence of new skin breakdown  Description: 1.  Monitor for areas of redness and/or skin breakdown  2.  Assess vascular access sites hourly  3.  Every 4-6 hours minimum:  Change oxygen saturation probe site  4.  Every 4-6 hours:  If on nasal continuous positive airway pressure, respiratory therapy assess nares and determine need for appliance change or resting period.  Outcome: Progressing

## 2024-02-21 LAB
ANION GAP SERPL CALCULATED.3IONS-SCNC: 12 MMOL/L (ref 9–17)
BUN SERPL-MCNC: 31 MG/DL (ref 8–23)
CALCIUM SERPL-MCNC: 9.3 MG/DL (ref 8.6–10.4)
CHLORIDE SERPL-SCNC: 92 MMOL/L (ref 98–107)
CO2 SERPL-SCNC: 28 MMOL/L (ref 20–31)
CREAT SERPL-MCNC: 1.1 MG/DL (ref 0.5–0.9)
ERYTHROCYTE [DISTWIDTH] IN BLOOD BY AUTOMATED COUNT: 14.2 % (ref 12.5–15.4)
GFR SERPL CREATININE-BSD FRML MDRD: 47 ML/MIN/1.73M2
GLUCOSE SERPL-MCNC: 138 MG/DL (ref 70–99)
HCT VFR BLD AUTO: 40.8 % (ref 36–46)
HGB BLD-MCNC: 14 G/DL (ref 12–16)
MCH RBC QN AUTO: 31.6 PG (ref 26–34)
MCHC RBC AUTO-ENTMCNC: 34.4 G/DL (ref 31–37)
MCV RBC AUTO: 91.9 FL (ref 80–100)
PLATELET # BLD AUTO: 159 K/UL (ref 140–450)
PMV BLD AUTO: 7.9 FL (ref 6–12)
POTASSIUM SERPL-SCNC: 3.7 MMOL/L (ref 3.7–5.3)
RBC # BLD AUTO: 4.44 M/UL (ref 4–5.2)
SODIUM SERPL-SCNC: 132 MMOL/L (ref 135–144)
WBC OTHER # BLD: 7 K/UL (ref 3.5–11)

## 2024-02-21 PROCEDURE — 1200000000 HC SEMI PRIVATE

## 2024-02-21 PROCEDURE — 97116 GAIT TRAINING THERAPY: CPT

## 2024-02-21 PROCEDURE — 36415 COLL VENOUS BLD VENIPUNCTURE: CPT

## 2024-02-21 PROCEDURE — 6370000000 HC RX 637 (ALT 250 FOR IP): Performed by: STUDENT IN AN ORGANIZED HEALTH CARE EDUCATION/TRAINING PROGRAM

## 2024-02-21 PROCEDURE — 2580000003 HC RX 258: Performed by: NURSE PRACTITIONER

## 2024-02-21 PROCEDURE — 85027 COMPLETE CBC AUTOMATED: CPT

## 2024-02-21 PROCEDURE — 97530 THERAPEUTIC ACTIVITIES: CPT

## 2024-02-21 PROCEDURE — 97110 THERAPEUTIC EXERCISES: CPT

## 2024-02-21 PROCEDURE — 99232 SBSQ HOSP IP/OBS MODERATE 35: CPT | Performed by: STUDENT IN AN ORGANIZED HEALTH CARE EDUCATION/TRAINING PROGRAM

## 2024-02-21 PROCEDURE — 97535 SELF CARE MNGMENT TRAINING: CPT

## 2024-02-21 PROCEDURE — 6360000002 HC RX W HCPCS: Performed by: NURSE PRACTITIONER

## 2024-02-21 PROCEDURE — 80048 BASIC METABOLIC PNL TOTAL CA: CPT

## 2024-02-21 RX ORDER — NIRMATRELVIR AND RITONAVIR 150-100 MG
KIT ORAL
Qty: 20 TABLET | Refills: 0 | Status: SHIPPED | OUTPATIENT
Start: 2024-02-21 | End: 2024-02-22 | Stop reason: HOSPADM

## 2024-02-21 RX ADMIN — HEPARIN SODIUM 5000 UNITS: 5000 INJECTION INTRAVENOUS; SUBCUTANEOUS at 20:18

## 2024-02-21 RX ADMIN — FAMOTIDINE 20 MG: 20 TABLET ORAL at 08:37

## 2024-02-21 RX ADMIN — LEVOTHYROXINE SODIUM 75 MCG: 75 TABLET ORAL at 06:14

## 2024-02-21 RX ADMIN — ASPIRIN 81 MG 81 MG: 81 TABLET ORAL at 08:36

## 2024-02-21 RX ADMIN — MONTELUKAST 10 MG: 10 TABLET, FILM COATED ORAL at 20:18

## 2024-02-21 RX ADMIN — FLUTICASONE PROPIONATE 1 SPRAY: 50 SPRAY, METERED NASAL at 08:53

## 2024-02-21 RX ADMIN — FUROSEMIDE 40 MG: 20 TABLET ORAL at 08:37

## 2024-02-21 RX ADMIN — QUETIAPINE FUMARATE 12.5 MG: 25 TABLET ORAL at 20:18

## 2024-02-21 RX ADMIN — METOPROLOL SUCCINATE 25 MG: 25 TABLET, EXTENDED RELEASE ORAL at 08:37

## 2024-02-21 RX ADMIN — SODIUM CHLORIDE, PRESERVATIVE FREE 10 ML: 5 INJECTION INTRAVENOUS at 20:19

## 2024-02-21 RX ADMIN — LISINOPRIL 20 MG: 10 TABLET ORAL at 08:38

## 2024-02-21 RX ADMIN — ROSUVASTATIN CALCIUM 10 MG: 20 TABLET, FILM COATED ORAL at 08:49

## 2024-02-21 RX ADMIN — SODIUM CHLORIDE, PRESERVATIVE FREE 10 ML: 5 INJECTION INTRAVENOUS at 08:55

## 2024-02-21 RX ADMIN — HYDROCHLOROTHIAZIDE 12.5 MG: 25 TABLET ORAL at 08:39

## 2024-02-21 RX ADMIN — AMLODIPINE BESYLATE 2.5 MG: 5 TABLET ORAL at 08:39

## 2024-02-21 RX ADMIN — HEPARIN SODIUM 5000 UNITS: 5000 INJECTION INTRAVENOUS; SUBCUTANEOUS at 08:50

## 2024-02-21 ASSESSMENT — PAIN SCALES - GENERAL
PAINLEVEL_OUTOF10: 0

## 2024-02-21 NOTE — CARE COORDINATION
Case Management Assessment  Initial Evaluation    Date/Time of Evaluation: 2/20/2024 8:07 PM  Assessment Completed by: Breana Pelayo    If patient is discharged prior to next notation, then this note serves as note for discharge by case management.    Patient Name: Noemy Alcantar                   YOB: 1932  Diagnosis: Dehydration [E86.0]  Generalized weakness [R53.1]  COVID [U07.1]  COVID-19 [U07.1]                   Date / Time: 2/18/2024  5:12 PM    Patient Admission Status: Inpatient   Readmission Risk (Low < 19, Mod (19-27), High > 27): Readmission Risk Score: 13.4    Current PCP: Suzie Varela MD  PCP verified by CM? Yes    Chart Reviewed: Yes      History Provided by: Patient  Patient Orientation: Alert and Oriented at times not oriented   Patient Cognition: Alert    Hospitalization in the last 30 days (Readmission):  No    If yes, Readmission Assessment in CM Navigator will be completed.    Advance Directives:      Code Status: DNR-CC   Patient's Primary Decision Maker is: Legal Next of Kin (daughter Gissel)      Discharge Planning:    Patient lives with: Alone Type of Home: Apartment  Primary Care Giver: Self  Patient Support Systems include: Children   Current Financial resources: Medicaid, Medicare  Current community resources: Other (Comment) (has a  come in once a month, meals on wheels 4 days/week)  Current services prior to admission: Durable Medical Equipment            Current DME: Cane, Walker            Type of Home Care services:  None    ADLS  Prior functional level: Assistance with the following:, Shopping, Mobility, Housework  Current functional level: Assistance with the following:, Housework, Shopping, Mobility    PT AM-PAC: 17 /24  OT AM-PAC: 18 /24    Family can provide assistance at DC: Other (comment) (limited)  Would you like Case Management to discuss the discharge plan with any other family members/significant others, and if so, who? No  Plans to  Return to Present Housing: Unknown at present  Other Identified Issues/Barriers to RETURNING to current housing: Weakness, some confusion at times and difficulty breathing at times.  Potential Assistance needed at discharge:  (TBD)            Potential DME:    Patient expects to discharge to: Apartment  Plan for transportation at discharge:  (TBD)    Financial    Payor: [employer] MEDICARE / Plan: [employer] MEDICARE ADVANTAGE HMO / Product Type: Medicare /     Does insurance require precert for SNF: Yes    Potential assistance Purchasing Medications: No  Meds-to-Beds request:        [facility] [id number] - [place], [place] - [address] - P [phone] - F [phone]  [address] [place] [address]  Phone: [phone] Fax: [phone]    [facility] - [address] - [facility] - P [phone] - F [phone]  [facility]  [place] [address]  Phone: [phone] Fax: [phone]      Notes:    Factors facilitating achievement of predicted outcomes: Family support, Motivated, Cooperative, and Pleasant    Barriers to discharge: Pain, Limited family support, Confusion, Limited safety awareness, Unrealistic expectations, Decreased endurance, Upper extremity weakness, Lower extremity weakness, and Medical complications    Additional Case Management Notes: Patient in and out of orientation.  States that she is very weak and cannot take care of the apartment her self and has a  come once a month but she does a less than good job.  She says that hospice has been in the hospital here to see her and set things up so when she needs them they will be available.  I see that palliative care saw her.  She says that some one is setting up more home care so someone can be there to help her if needed.  I am not sure who was discussing this with her.  I know PT/OT is recommending a SNF for rehabing a bit but patient seems hesitant to explore this even though

## 2024-02-21 NOTE — PROGRESS NOTES
Physical Therapy  Facility/Department: [address]  Daily Treatment Note     Name: Noemy Alcantar  : 1932  MRN: [id number]  Date of Service: 2024          Chief Complaint   Patient presents with    Fatigue       Increased fatigue 'states just dont feel good\"      Discharge Recommendations:  Patient would benefit from continued therapy after discharge   PT Equipment Recommendations  Equipment Needed: No (rolling walker and 4ww at home)      Patient Diagnosis(es): The primary encounter diagnosis was Dehydration. Diagnoses of Generalized weakness and COVID-19 were also pertinent to this visit.  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  Past Surgical History:  has a past surgical history that includes Carotid endarterectomy (Right,  or 2005); eye surgery (Bilateral); and pre-malignant / benign skin lesion excision (3/26/2013).     Assessment   Body Structures, Functions, Activity Limitations Requiring Skilled Therapeutic Intervention: Decreased functional mobility ;Decreased safe awareness;Decreased cognition;Decreased balance;Decreased strength     Assessment: Pt presents with fatigue, (+) COVID and dehydration. Pt lives alone and was modified independent with various assistive devices in house. Pt currently requiring min assist for bed mobility, Min assist for transfers, and Min assist to ambulate 60ft with RW. Pt does not demonstrate adequate safety for return to prior independent living situation due to decreased balance and safety awareness. Pt will benefit from continue skilled PT for strengthening, balance, safety and functional mobility training while in the hospital and at discharge.     Therapy Prognosis: Good     Decision Making: Medium  1 and 15ft x to toilet      Balance  Posture: Fair  Sitting - Static: Good;-  Sitting - Dynamic: Fair;-  Standing - Static: Fair;-  Standing - Dynamic: Poor;+  Comments: Pt stood at sink with CGA for hand hygiene.    Seated LE exercise program: Long Arc Quads, hip abduction/adduction, heel/toe raises, and marches. Reps: 10 x 2 BLEs, increased time and verbal cueing throughout.    [facility] - Mob[facility]  AM-PAC Basic Mobility - Inpatient   How much help is needed turning from your back to your side while in a flat bed without using bedrails?: A Little  How much help is needed moving from lying on your back to sitting on the side of a flat bed without using bedrails?: A Little  How much help is needed moving to and from a bed to a chair?: A Little  How much help is needed standing up from a chair using your arms?: A Little  How much help is needed walking in hospital room?: A Little  How much help is needed climbing 3-5 steps with a railing?: A Lot  AM-PAC Inpatient Mobility Raw Score : 17  AM-PAC Inpatient T-Scale Score : 42.13  Mobility Inpatient CMS 0-100% Score: 50.57  Mobility Inpatient CMS G-Code Modifier : CK      Goals  Short Term Goals  Time Frame for Short Term Goals: 14 visits  Short Term Goal 1: sit to and from stand independent  Short Term Goal 2: ambulate 80ft with rolling walker modified independent  Short Term Goal 3: One platform step with right railing with supervision  Patient Goals   Patient Goals : to return home     Education  Patient Education  Education Given To: Patient  Education Provided: Role of Therapy;Fall Prevention Strategies;Plan of Care;Transfer Training;Equipment  Education Method: Demonstration;Verbal  Barriers to Learning: Cognition  Education Outcome: Verbalized understanding;Demonstrated understanding;Continued education needed        Therapy Time    Individual Concurrent Group Co-treatment   Time In 1305      Time Out 1345      Minutes 40      Timed Code Treatment Minutes: 40  Minutes    Angeline Kelley, PT

## 2024-02-21 NOTE — DISCHARGE INSTR - COC
Continuity of Care Form    Patient Name: Noemy Alcantar   :  1932  MRN:  [id number]    Admit date:  2024  Discharge date:  2024    Code Status Order: DNR-CC   Advance Directives:     Admitting Physician:  Rio Zambrano DO  PCP: Suzie Varela MD    Discharging Nurse: MARITA  Discharging Hospital Unit/Room#: 311/311-01  Discharging Unit Phone Number: [phone]    Emergency Contact:   Extended Emergency Contact Information  Primary Emergency Contact: Gissel Wheeler  Address: [address] [place] [address] of [facility]  Home Phone: [phone]  Relation: Child  Secondary Emergency Contact: Braxton Alcantar  Home Phone: [phone]  Mobile Phone: [phone]  Relation: Child    Past Surgical History:  Past Surgical History:   Procedure Laterality Date    CAROTID ENDARTERECTOMY Right  or 2005    EYE SURGERY Bilateral     Patient states she had lenses implanted.    PRE-MALIGNANT / BENIGN SKIN LESION EXCISION  3/26/2013    Excision lesion of nose. Dr. JASON Paris       Immunization History:     There is no immunization history on file for this patient.    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    Actinic keratosis L57.0    Neoplasm of uncertain behavior of skin D48.5    Chest pain R07.9    Atrial fibrillation (HCC) I48.91    Bradycardia R00.1    Chronic renal insufficiency, stage III (moderate) (HCC) N18.30    Fatigue R53.83    Hypercholesterolemia E78.00    Hypertension I10    Syncope R55    Leukocytosis D72.829    Nausea and vomiting R11.2    COVID U07.1    Acute cystitis without hematuria N30.00    Encounter for palliative care Z51.5    Goals of care, counseling/discussion Z71.89    COVID-19 U07.1       Isolation/Infection:   Isolation            Droplet Plus          Patient Infection Status       Infection Onset Added Last Indicated Last Indicated By Review Planned Expiration

## 2024-02-21 NOTE — CARE COORDINATION
received call from [facility]. They are out of network for insurance. [facility] to review for [facility] for placement. Daughter called and told nurse she was interested in assisted living after skilled stay. Waiting for acceptance.

## 2024-02-21 NOTE — PROGRESS NOTES
[facility]  Office: [phone]  Yaya Zambrano DO, Aris Lomax DO, Lito To DO, Deandre Webb DO, Bacilio Lee MD, Gavi Herndon MD, Rogelio Barnes MD, Sherron Jordan MD,  Chao Baker MD, Lyly Lyons MD, Luciana Way MD,  Elizabeth Dawn DO, Sommer Richards MD, Chip Saucedo MD, Rio Zambrano DO, Liz Burgess MD,  Donald Stanley DO, Liz Bush MD, Elana Chu MD, Dana Hawley MD, Festus Murdock MD,  Aiden Greco MD, Petey De León MD, Augusto Collins MD, Ale Ladd MD, Garry Brantley MD, Tra Cobian MD, Roberto Carlos Pena DO, Franklyn Palacios DO, Isabel Langley MD,  Navid Lewis MD, Shirley Waterhouse, CNP,  Valerie Gaytan, CNP, Seb Holguin, CNP,  Pat Longoria, RADNALL, Soha Wilson, CNP, Josefina Tanner, CNP, Cece Merlos CNP, Zeina Purcell, CNP, Eveline Jackson, CNP, Sanaz Song, PA-C, Farheen Villalba PA-C, Pat Hodge, CNP, Adriana Lim, CNP, Sasha Ahuja, CNS, Gissel Heredia, CNP, Gabi Lujan CNP, Tracy Schwab, CNP         [facility]   IN-PATIENT SERVICE   [facility]    Progress Note    2/21/2024    10:18 AM    Name:   Noemy Alcantar  MRN:     [id number]     Acct:      [id number]   Room:   [facility] Day:  3  Admit Date:  2/18/2024  5:12 PM    PCP:   Suzie Varela MD  Code Status:  DNR-CC    Subjective:     Patient was seen and examined at bedside this AM. She reports feeling much better and is without complaint. The patient remains on room air. Awaiting SNF placement.     Medications:     Allergies:    Allergies   Allergen Reactions    Latex     Banana     Eggs Or Egg-Derived Products     Other Other (See Comments)     PERFUMES, ODORS, COLOGNES cause eyes to water  GRASS causes eyes to water, nose to get stuffy    Peanut-Containing Drug Products     Penicillins Swelling and Other (See Comments)     Had PCN when a tooth was pulled and experienced a reaction she cannot quite remember, \"possibly some swelling\"

## 2024-02-21 NOTE — PROGRESS NOTES
Occupational Therapy  Facility/Department: [address]  Occupational Therapy Daily Treatment Note    Name: Noemy Alcantar  : 1932  MRN: [id number]  Date of Service: 2024    Chief Complaint   Patient presents with    Fatigue     Increased fatigue 'states just dont feel good\"     Discharge Recommendations:  Patient would benefit from continued therapy after discharge  OT Equipment Recommendations  Other: TBD    Patient Diagnosis(es): The primary encounter diagnosis was Dehydration. Diagnoses of Generalized weakness and COVID-19 were also pertinent to this visit.  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  Past Surgical History:  has a past surgical history that includes Carotid endarterectomy (Right,  or ); eye surgery (Bilateral); and pre-malignant / benign skin lesion excision (3/26/2013).    Assessment   Performance deficits / Impairments: Decreased functional mobility ;Decreased ADL status;Decreased balance;Decreased strength;Decreased safe awareness    Assessment: Patient demonstrates decreased ADLs, balance, UE strength, safety and functional mobility following hospitalization due to fatigue, chills, cough, (+) COVID. Patient would benefit from skilled OT services addressing above deficits while here at hospital and following discharge to maximize independence and safety. Patient currently unsafe to return to prior living arrangements.    Prognosis: Good  REQUIRES OT FOLLOW-UP: Yes  Activity Tolerance  Activity Tolerance: Patient Tolerated treatment well        Plan   Occupational Therapy Plan  Times Per Week: 5-6x/wk  Current Treatment Recommendations: Balance training, Functional mobility training, Strengthening, Patient/Caregiver  recliner at start of tx and retired to recliner following tx    Transfers  Sit to stand: Contact guard assistance  Stand to sit: Contact guard assistance  Transfer Comments: cues for hand placement    Education Given To: Patient  Education Provided: Transfer Training;Equipment;ADL Adaptive Strategies;Fall Prevention Strategies;Energy Conservation;Precautions  Education Method: Verbal;Demonstration  Barriers to Learning: Cognition  Education Outcome: Verbalized understanding;Continued education needed    AM-PAC - ADL  AM-PAC Daily Activity - Inpatient   How much help is needed for putting on and taking off regular lower body clothing?: A Lot  How much help is needed for bathing (which includes washing, rinsing, drying)?: A Little  How much help is needed for toileting (which includes using toilet, bedpan, or urinal)?: A Little  How much help is needed for putting on and taking off regular upper body clothing?: A Little  How much help is needed for taking care of personal grooming?: A Little  How much help for eating meals?: None  AM-PAC Inpatient Daily Activity Raw Score: 18  AM-PAC Inpatient ADL T-Scale Score : 38.66  ADL Inpatient CMS 0-100% Score: 46.65  ADL Inpatient CMS G-Code Modifier : CK    Goals  Short Term Goals  Time Frame for Short Term Goals: 14 visits  Short Term Goal 1: Pt will complete UB ADLs/grooming with MOD IND  Short Term Goal 2: Pt will complete LB ADLs/toileting with MOD IND  Short Term Goal 3: Pt will complete functional mobility/transfers with MOD IND in prep for ADL completion  Short Term Goal 4: Pt will increase B UE strength to 4+/5 for increased ease with ADL completion  Short Term Goal 5: Pt will improve dynamic standing balance to good for increased safety during standing aspects of ADL completion  Patient Goals   Patient goals : get comfortable    Therapy Time   Individual Concurrent Group Co-treatment   Time In 1449         Time Out 1533         Minutes 44         Timed Code Treatment  Minutes: 40 Minutes       Espinoza Coughlin, OTRL

## 2024-02-21 NOTE — CARE COORDINATION
discussed need for skilled nursing with patient. Patient is agreeable to placement somewhere close in [place]. Patient chose [facility] in [place] based on ratings. Referrals sent at this time.

## 2024-02-22 VITALS
OXYGEN SATURATION: 97 % | BODY MASS INDEX: 19.14 KG/M2 | HEIGHT: 63 IN | SYSTOLIC BLOOD PRESSURE: 132 MMHG | HEART RATE: 85 BPM | WEIGHT: 108 LBS | DIASTOLIC BLOOD PRESSURE: 65 MMHG | TEMPERATURE: 97.3 F | RESPIRATION RATE: 16 BRPM

## 2024-02-22 PROBLEM — R53.81 PHYSICAL DEBILITY: Status: ACTIVE | Noted: 2020-02-22

## 2024-02-22 PROCEDURE — 6370000000 HC RX 637 (ALT 250 FOR IP): Performed by: NURSE PRACTITIONER

## 2024-02-22 PROCEDURE — 6360000002 HC RX W HCPCS: Performed by: NURSE PRACTITIONER

## 2024-02-22 PROCEDURE — 6370000000 HC RX 637 (ALT 250 FOR IP): Performed by: STUDENT IN AN ORGANIZED HEALTH CARE EDUCATION/TRAINING PROGRAM

## 2024-02-22 PROCEDURE — 97116 GAIT TRAINING THERAPY: CPT

## 2024-02-22 PROCEDURE — 2580000003 HC RX 258: Performed by: NURSE PRACTITIONER

## 2024-02-22 PROCEDURE — 97110 THERAPEUTIC EXERCISES: CPT

## 2024-02-22 PROCEDURE — 99238 HOSP IP/OBS DSCHRG MGMT 30/<: CPT | Performed by: STUDENT IN AN ORGANIZED HEALTH CARE EDUCATION/TRAINING PROGRAM

## 2024-02-22 RX ADMIN — FLUTICASONE PROPIONATE 1 SPRAY: 50 SPRAY, METERED NASAL at 10:24

## 2024-02-22 RX ADMIN — HYDROCHLOROTHIAZIDE 12.5 MG: 25 TABLET ORAL at 10:22

## 2024-02-22 RX ADMIN — ASPIRIN 81 MG 81 MG: 81 TABLET ORAL at 10:22

## 2024-02-22 RX ADMIN — ROSUVASTATIN CALCIUM 10 MG: 20 TABLET, FILM COATED ORAL at 10:23

## 2024-02-22 RX ADMIN — ACETAMINOPHEN 650 MG: 325 TABLET ORAL at 13:09

## 2024-02-22 RX ADMIN — SODIUM CHLORIDE, PRESERVATIVE FREE 10 ML: 5 INJECTION INTRAVENOUS at 10:29

## 2024-02-22 RX ADMIN — HEPARIN SODIUM 5000 UNITS: 5000 INJECTION INTRAVENOUS; SUBCUTANEOUS at 10:20

## 2024-02-22 RX ADMIN — AMLODIPINE BESYLATE 2.5 MG: 5 TABLET ORAL at 10:23

## 2024-02-22 RX ADMIN — FUROSEMIDE 40 MG: 20 TABLET ORAL at 10:22

## 2024-02-22 RX ADMIN — FAMOTIDINE 20 MG: 20 TABLET ORAL at 10:22

## 2024-02-22 RX ADMIN — LISINOPRIL 20 MG: 10 TABLET ORAL at 10:22

## 2024-02-22 RX ADMIN — METOPROLOL SUCCINATE 25 MG: 25 TABLET, EXTENDED RELEASE ORAL at 10:20

## 2024-02-22 RX ADMIN — LEVOTHYROXINE SODIUM 75 MCG: 75 TABLET ORAL at 05:58

## 2024-02-22 ASSESSMENT — PAIN SCALES - GENERAL
PAINLEVEL_OUTOF10: 2
PAINLEVEL_OUTOF10: 3

## 2024-02-22 ASSESSMENT — PAIN DESCRIPTION - DESCRIPTORS: DESCRIPTORS: ACHING;DISCOMFORT

## 2024-02-22 ASSESSMENT — PAIN DESCRIPTION - LOCATION: LOCATION: GENERALIZED;HIP

## 2024-02-22 ASSESSMENT — PAIN DESCRIPTION - ORIENTATION: ORIENTATION: RIGHT;LEFT

## 2024-02-22 NOTE — PROGRESS NOTES
[facility]  Office: [phone]  Yaya Zambrano DO, Aris Lomax DO, Lito To DO, Deandre Webb DO, Bacilio Lee MD, aGvi Herndon MD, Rogelio Barnes MD, Sherron Jordan MD,  Chao Baker MD, Lyly Lyons MD, Luciana Way MD,  Elizabeth Dawn DO, Sommer Richards MD, Chip Saucedo MD, Rio Zambrano DO, Liz Burgess MD,  Donald Stanley DO, Liz Bush MD, Elana Chu MD, Dana Hawley MD, Festus Murdock MD,  Aiden Greco MD, Petey De León MD, Augusto Collins MD, Ale Ladd MD, Garry Brantley MD, Tra Cobian MD, Roberto Carlos Pena DO, Franklyn Palacios DO, Isabel Langley MD,  Navid Lewis MD, Shirley Waterhouse, CNP,  Valerie Gaytan, CNP, Seb Holguin, CNP,  Pat Longroia, RANDALL, Soha Wilson, CNP, Josefina Tanner, CNP, Cece Merlos CNP, Zeina Purcell CNP, Eveline Jackson, CNP, Sanaz Song, PA-C, Farheen Villalba PA-C, Pat Hodge, CNP, Adriana Lim, CNP, Sasha Ahuja, CNS, Gissel Heredia, CNP, Gabi Lujan CNP, Tracy Schwab, CNP         [facility]   IN-PATIENT SERVICE   [facility]    Progress Note    2/22/2024    10:59 AM    Name:   Noemy Alcantar  MRN:     [id number]     Acct:      [id number]   Room:   311/311-01   Day:  4  Admit Date:  2/18/2024  5:12 PM    PCP:   Suzie Varela MD  Code Status:  DNR-CC    Subjective:     Patient was seen and examined at bedside this AM. She reports feeling well and is without complaint. Discussed plan of care with multiple family members over the phone. Family expressed concern that the patient's memory/mental status has been declining over the past few years and stated that they did not feel she was competent to make her own medical decisions. The patient's son (Francisco) states that he is in the process of having his mother evaluated for dementia. The patient has been intermittently confused during hospitalization and I agree with family that she likely has underlying dementia. This patient will  redness, tenderness in the calves  Skin:  no gross lesions, rashes, induration    Assessment:     Hospital Problems             Last Modified POA    * (Principal) COVID 2/18/2024 Yes    Leukocytosis 2/19/2024 Yes    Atrial fibrillation (HCC) 2/19/2024 Yes    Chronic renal insufficiency, stage III (moderate) (HCC) 2/19/2024 Yes    Hypertension 2/19/2024 Yes    Acute cystitis without hematuria 2/19/2024 Yes    Encounter for palliative care 2/20/2024 Yes    Goals of care, counseling/discussion 2/20/2024 Yes    COVID-19 2/20/2024 Yes       Plan:     COVID-19 infection   -The patient has been weaned off of supplemental oxygen   -Continue Paxlovid   -PT/OT   -The patient is interested in SNF placement     Hypothyroidism   -Continue home levothyroxine     Hypertension   -Continue home Prinzide     Hyperlipidemia   -Continue home Feliciaor       Chip Saucedo MD  2/22/2024  10:59 AM

## 2024-02-22 NOTE — PROGRESS NOTES
Physical Therapy  Facility/Department: [address]  Physical Therapy Daily Treatment Note    Name: Noemy Alcantar  : 1932  MRN: [id number]  Date of Service: 2024    Discharge Recommendations:  Patient would benefit from continued therapy after discharge   PT Equipment Recommendations  Equipment Needed: No (has RW and 4WW)      Patient Diagnosis(es): The primary encounter diagnosis was Dehydration. Diagnoses of Generalized weakness and COVID-19 were also pertinent to this visit.  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  Past Surgical History:  has a past surgical history that includes Carotid endarterectomy (Right,  or ); eye surgery (Bilateral); and pre-malignant / benign skin lesion excision (3/26/2013).    Assessment   Body Structures, Functions, Activity Limitations Requiring Skilled Therapeutic Intervention: Decreased functional mobility ;Decreased safe awareness;Decreased cognition;Decreased balance;Decreased strength  Assessment: Pt presents with fatigue, (+) COVID and dehydration. Pt lives alone and was modified independent with various assistive devices in house. Pt currently CGA transfers even with toilet transfers; required CGA for ambulation in room x 15 ft then 70 ft with RW, cues on RW management with turns. No LOB seen. Pt does not demonstrate adequate safety for return to prior independent living situation. Pt will benefit from continue skilled PT for strengthening, balance, safety and functional mobility training while in the hospital and at discharge.  Therapy Prognosis: Good  Decision Making: Medium Complexity  Requires PT Follow-Up: Yes  Activity Tolerance  Activity Tolerance: Patient limited by endurance  Activity  Comments: Pt up in recliner at start of tx and retired to recliner post-tx.  Transfers  Sit to Stand: Contact guard assistance  Stand to Sit: Contact guard assistance  Comment: cues on hand placement and RW management; also CGA for toilet transfer with cues on using grab bars for safety; pt max for brief change and min A for hygiene  Ambulation  Surface: Level tile  Device: Rolling Walker  Assistance: Contact guard assistance  Quality of Gait: no LOB seen; cues to get closer to RW, pt reported feeling tired towards end of second bout of ambulation, reported feeling better after seated rest  Gait Deviations: Slow Zohreh;Decreased step height  Distance: 15 ft, 70 ft with RW (seated rest break between ambulation bouts)  More Ambulation?: No     Balance  Posture: Fair  Sitting - Static: Good  Sitting - Dynamic: Fair  Standing - Static: Fair  Standing - Dynamic: Fair;-  Comments: standing with RW  A/AROM Exercises: seated BLE ex x 10 reps, CGA/SBA for cues: ankle pumps, quad sets, heel slides, hip abd/add, SAQ, LAQ, seated march; rest breaks between ex        OutComes Score                 -PAC - Mobility    -PAC Basic Mobility - Inpatient   How much help is needed turning from your back to your side while in a flat bed without using bedrails?: A Little  How much help is needed moving from lying on your back to sitting on the side of a flat bed without using bedrails?: A Little  How much help is needed moving to and from a bed to a chair?: A Little  How much help is needed standing up from a chair using your arms?: A Little  How much help is needed walking in hospital room?: A Little  How much help is needed climbing 3-5 steps with a railing?: A Lot  AM-PAC Inpatient Mobility Raw Score : 17  AM-PAC Inpatient T-Scale Score : 42.13  Mobility Inpatient CMS 0-100% Score: 50.57  Mobility Inpatient CMS G-Code Modifier : CK       Goals  Short Term Goals  Time Frame for Short Term Goals: 14 visits  Short Term Goal 1: sit to

## 2024-02-22 NOTE — CARE COORDINATION
Request sent for transport of patient to [facility].    1449-Transport set up for 1600. Called [facility] and informed them of transport time.  Prepared packet.

## 2024-02-22 NOTE — DISCHARGE SUMMARY
[facility]  Office: [phone]  Yaya Zambrano DO, Aris Lomax DO, Lito To DO, Deandre Webb DO, Bacilio Lee MD, Gavi Herndon MD, Rogelio Barnes MD, Sherron Jordan MD,  Chao Baker MD, Lyly Lyons MD, Luciana Way MD,  Elizabeth Dawn DO, Sommer Richards MD, Chip Saucedo MD, Rio Zambrano DO, Liz Burgess MD,  Donald Stanley DO, Liz Bush MD, Elana Chu MD, Dana Hawley MD, Festus Murdock MD,  Aiden Greco MD, Petey De León MD, Augusto Collins MD, Ale Ladd MD, Garry Brantley MD, Tra Cobian MD, Roberto Carlos Pena DO, Franklyn Palacios DO, Isabel Langley MD,  Navid Lewis MD, Shirley Waterhouse, CNP,  Valerie Gaytan, CNP, Seb Holguin, CNP,  Pat Longoria, DNP, Soha Wilson, CNP, Josefina Tanner, CNP, Cece Merlos CNP, Zeina Purcell, CNP, Eveline Jackson, CNP, Sanaz Song, PA-C, Farheen Villalba, PA-C, Pat Hodge, CNP, Janelle Chin, CNP, Adriana Lim, CNP, Sasha Ahuja, CNS, Gsisel Heredia, CNP, Gabi Lujan, CNP, Tracy Schwab, CNP         [facility]   IN-PATIENT SERVICE   [facility]    Discharge Summary     Patient ID: Noemy Alcantar  :  1932   MRN: [id number]     ACCOUNT:  [id number]   Patient's PCP: Suzie Varela MD  Admit Date: 2024   Discharge Date: 2024  Length of Stay: 4  Code Status:  DNR-CC  Admitting Physician: Rio Zambrano DO  Discharge Physician: Chip Saucedo MD     Active Discharge Diagnoses:     Hospital Problem Lists:  Principal Problem:    COVID  Active Problems:    Leukocytosis    Atrial fibrillation (HCC)    Chronic renal insufficiency, stage III (moderate) (HCC)    Hypertension    Acute cystitis without hematuria    Encounter for palliative care    Goals of care, counseling/discussion    COVID-19  Resolved Problems:    * No resolved hospital problems. *      Admission Condition:  fair     Discharged Condition: good    Hospital Stay:     Hospital Course:  Patient is a  89-year-old female with a past medical history of hypothyroidism, hypertension and hyperlipidemia who presented to the emergency department on 2/18/2024 complaining of fatigue and inability to ambulate. The patient states that her symptoms began several days ago and have progressively worsened. In the ED, the patient was afebrile and nontoxic appearing. COVID-19 test was positive. The patient was unable to be safely discharged home due to weakness and inability to care for herself. She was admitted to internal medicine and significantly improved with Paxlovid. The patient was not hypoxic and did not require supplemental oxygen during admission. She is discharged to SNF today (2/22) in stable condition.     Significant therapeutic interventions: Paxlovid; supportive management     Significant Diagnostic Studies:   Labs:  Hematology:  Recent Labs     02/20/24  0634 02/21/24  0649   WBC 9.5 7.0   RBC 4.61 4.44   HGB 14.5 14.0   HCT 42.7 40.8   MCV 92.6 91.9   MCH 31.4 31.6   MCHC 33.9 34.4   RDW 14.3 14.2   * 159   MPV 8.3 7.9     Chemistry:  Recent Labs     02/20/24  0634 02/21/24  0649   * 132*   K 3.4* 3.7   CL 93* 92*   CO2 29 28   GLUCOSE 93 138*   BUN 17 31*   CREATININE 1.0* 1.1*   ANIONGAP 10 12   LABGLOM 53* 47*   CALCIUM 9.1 9.3   No results for input(s): \"PROT\", \"LABALBU\", \"LABA1C\", \"M0XWQDR\", \"M5ILCRJ\", \"FT4\", \"TSH\", \"AST\", \"ALT\", \"LDH\", \"GGT\", \"ALKPHOS\", \"LABGGT\", \"BILITOT\", \"BILIDIR\", \"AMMONIA\", \"AMYLASE\", \"LIPASE\", \"LACTATE\", \"CHOL\", \"HDL\", \"LDLCHOLESTEROL\", \"CHOLHDLRATIO\", \"TRIG\", \"VLDL\", \"JDN70EA\", \"PHENYTOIN\", \"PHENYF\", \"URICACID\", \"POCGLU\" in the last 72 hours.  ABG:No results found for: \"POCPH\", \"PHART\", \"PH\", \"POCPCO2\", \"ZEB2UJL\", \"PCO2\", \"POCPO2\", \"PO2ART\", \"PO2\", \"POCHCO3\", \"HUP8GQZ\", \"HCO3\", \"NBEA\", \"PBEA\", \"BEART\", \"BE\", \"THGBART\", \"THB\", \"GKX6SPF\", \"FMPA3SRX\", \"W9OZVUFJ\", \"O2SAT\", \"FIO2\"  Lab Results   Component Value Date/Time    SPECIAL 6MLS LEFT WRIST 10/23/2023 08:10 PM      Lab Results   Component Value Date/Time    CULTURE NO GROWTH 02/18/2024 10:10 PM       Radiology:  XR CHEST (2 VW)    Result Date: 2/18/2024  Chronic changes of the lungs without definite acute abnormality.       Consultations:    Consults:     Final Specialist Recommendations/Findings:   IP CONSULT TO PALLIATIVE CARE  PHARMACY TO DOSE MEDICATION      The patient was seen and examined on day of discharge and this discharge summary is in conjunction with any daily progress note from day of discharge.    Discharge plan:     Disposition: To a non-[facility]    Physician Follow Up:     Follow-up with your PCP as needed.     Requiring Further Evaluation/Follow Up POST HOSPITALIZATION/Incidental Findings: None.     Diet: regular diet    Activity: As tolerated    Instructions to Patient: Follow-up with your primary care physician as needed.     Discharge Medications:      Medication List        CONTINUE taking these medications      amLODIPine 2.5 MG tablet  Commonly known as: NORVASC     aspirin 81 MG chewable tablet     azelastine 0.05 % ophthalmic solution  Commonly known as: OPTIVAR     Crestor 10 MG tablet  Generic drug: rosuvastatin     famotidine 10 MG tablet  Commonly known as: PEPCID     fluocinonide 0.05 % external solution  Commonly known as: LIDEX     fluticasone 50 MCG/ACT nasal spray  Commonly known as: FLONASE     furosemide 40 MG tablet  Commonly known as: Lasix  Take 1 tablet by mouth See Admin Instructions Take in Morning     Hair/Skin/Nails/Biotin Tabs     levothyroxine 75 MCG tablet  Commonly known as: SYNTHROID     lisinopril 20 MG tablet  Commonly known as: PRINIVIL;ZESTRIL  Take 1 tablet by mouth daily     lisinopril-hydroCHLOROthiazide 20-12.5 MG per tablet  Commonly known as: PRINZIDE;ZESTORETIC     metoprolol succinate 25 MG extended release tablet  Commonly known as: TOPROL XL  Take 1 tablet by mouth daily     montelukast 10 MG tablet  Commonly known as: SINGULAIR     raNITIdine 150 MG

## 2024-02-27 NOTE — ED PROVIDER NOTES
for the following components:    Neutrophils % 71 (*)     Lymphocytes % 17 (*)     All other components within normal limits   URINALYSIS WITH REFLEX TO CULTURE - Abnormal; Notable for the following components:    Turbidity UA Cloudy (*)     Glucose, Ur 2+ (*)     Leukocyte Esterase, Urine MODERATE (*)     All other components within normal limits   MICROSCOPIC URINALYSIS - Abnormal; Notable for the following components:    Bacteria, UA FEW (*)     Other Observations UA Culture ordered based on defined criteria. (*)     All other components within normal limits   CBC WITH AUTO DIFFERENTIAL - Abnormal; Notable for the following components:    Neutrophils % 84 (*)     Lymphocytes % 9 (*)     Eosinophils % 0 (*)     Lymphocytes Absolute 0.70 (*)     All other components within normal limits   COMPREHENSIVE METABOLIC PANEL W/ REFLEX TO MG FOR LOW K - Abnormal; Notable for the following components:    Chloride 95 (*)     Glucose 123 (*)     Creatinine 1.0 (*)     Est, Glom Filt Rate 53 (*)     Albumin/Globulin Ratio 0.9 (*)     Total Bilirubin 0.2 (*)     All other components within normal limits   C-REACTIVE PROTEIN - Abnormal; Notable for the following components:    CRP 23.4 (*)     All other components within normal limits   CBC - Abnormal; Notable for the following components:    Platelets 124 (*)     All other components within normal limits   BASIC METABOLIC PANEL - Abnormal; Notable for the following components:    Sodium 132 (*)     Potassium 3.4 (*)     Chloride 93 (*)     Creatinine 1.0 (*)     Est, Glom Filt Rate 53 (*)     All other components within normal limits   BASIC METABOLIC PANEL - Abnormal; Notable for the following components:    Sodium 132 (*)     Chloride 92 (*)     Glucose 138 (*)     BUN 31 (*)     Creatinine 1.1 (*)     Est, Glom Filt Rate 47 (*)     All other components within normal limits   RAPID INFLUENZA A/B ANTIGENS   RSV RAPID ANTIGEN   CULTURE, URINE   VITAMIN D 25 HYDROXY   CBC

## 2024-03-19 NOTE — PROGRESS NOTES
Physician Progress Note      PATIENT:               RD ROJAS  CSN #:                  [id number]  :                       1932  ADMIT DATE:       2024 5:12 PM  DISCH DATE:        2024 4:51 PM  RESPONDING  PROVIDER #:        Chip Saucedo MD          QUERY TEXT:    Patient admitted with Covid. Noted documentation of pneumonia in the [facility]. In   order to support the diagnosis of pneumonia, please include additional   clinical indicators in your documentation.  Or please document if the   diagnosis of pneumonia has been ruled out after further study.    The medical record reflects the following:  Risk Factors: Covid  Clinical Indicators:  Treatment: Rocephin 1000mg, Levaquin 250mg. Frequently monitor respiratory   status, CXR and vital signs.    Thank you for your time, Ghada GALAVIZ, RN CDS. If you have questions, please   call [phone] (M-F 7a-3p).  Options provided:  -- Pneumonia present as evidenced by, please document evidence, Please   document evidence.  -- Pneumonia was ruled out  -- Other - I will add my own diagnosis  -- Disagree - Not applicable / Not valid  -- Disagree - Clinically unable to determine / Unknown  -- Refer to Clinical Documentation Reviewer    PROVIDER RESPONSE TEXT:    Pneumonia was ruled out after study.    Query created by: Ghada Lamar on 3/15/2024 10:39 AM      Electronically signed by:  Chip Saucedo MD 3/19/2024 5:03 AM

## 2024-10-20 ENCOUNTER — APPOINTMENT (OUTPATIENT)
Dept: CT IMAGING | Age: 89
End: 2024-10-20
Payer: MEDICARE

## 2024-10-20 ENCOUNTER — HOSPITAL ENCOUNTER (EMERGENCY)
Age: 89
Discharge: HOME | End: 2024-10-20
Attending: EMERGENCY MEDICINE
Payer: MEDICARE

## 2024-10-20 VITALS
HEIGHT: 64 IN | SYSTOLIC BLOOD PRESSURE: 183 MMHG | RESPIRATION RATE: 20 BRPM | BODY MASS INDEX: 19.57 KG/M2 | HEART RATE: 63 BPM | OXYGEN SATURATION: 90 % | TEMPERATURE: 97.9 F | WEIGHT: 114.64 LBS | DIASTOLIC BLOOD PRESSURE: 98 MMHG

## 2024-10-20 DIAGNOSIS — S09.90XA INJURY OF HEAD, INITIAL ENCOUNTER: Primary | ICD-10-CM

## 2024-10-20 DIAGNOSIS — S00.83XA CONTUSION OF FACE, INITIAL ENCOUNTER: ICD-10-CM

## 2024-10-20 DIAGNOSIS — S01.81XA FACIAL LACERATION, INITIAL ENCOUNTER: ICD-10-CM

## 2024-10-20 PROCEDURE — 70450 CT HEAD/BRAIN W/O DYE: CPT

## 2024-10-20 PROCEDURE — 70486 CT MAXILLOFACIAL W/O DYE: CPT

## 2024-10-20 PROCEDURE — 72131 CT LUMBAR SPINE W/O DYE: CPT

## 2024-10-20 PROCEDURE — 99284 EMERGENCY DEPT VISIT MOD MDM: CPT

## 2024-10-20 PROCEDURE — 72125 CT NECK SPINE W/O DYE: CPT

## 2024-10-20 PROCEDURE — 72192 CT PELVIS W/O DYE: CPT

## 2024-10-20 PROCEDURE — 6370000000 HC RX 637 (ALT 250 FOR IP): Performed by: EMERGENCY MEDICINE

## 2024-10-20 RX ORDER — ACETAMINOPHEN 500 MG
1000 TABLET ORAL ONCE
Status: COMPLETED | OUTPATIENT
Start: 2024-10-20 | End: 2024-10-20

## 2024-10-20 RX ADMIN — ACETAMINOPHEN 1000 MG: 500 TABLET ORAL at 09:44

## 2024-10-20 ASSESSMENT — PAIN DESCRIPTION - DESCRIPTORS: DESCRIPTORS: ACHING

## 2024-10-20 ASSESSMENT — PAIN SCALES - WONG BAKER: WONGBAKER_NUMERICALRESPONSE: HURTS EVEN MORE

## 2024-10-20 ASSESSMENT — PAIN DESCRIPTION - PAIN TYPE: TYPE: ACUTE PAIN

## 2024-10-20 ASSESSMENT — PAIN DESCRIPTION - LOCATION: LOCATION: BACK;HEAD

## 2024-10-20 ASSESSMENT — PAIN - FUNCTIONAL ASSESSMENT: PAIN_FUNCTIONAL_ASSESSMENT: WONG-BAKER FACES

## 2024-10-20 NOTE — ED PROVIDER NOTES
[facility] Emergency Department  [address]  [place] [address]  Phone: [phone]  Fax: [phone]    EMERGENCY DEPARTMENT ENCOUNTER          Pt Name: Noemy Alcantar  MRN: [id number]  Birthdate 1/16/1932  Date of evaluation: 10/20/2024      CHIEF COMPLAINT       Chief Complaint   Patient presents with    Head Injury    Facial Injury     Stumble and fall today while getting out of chair.  Large hematoma and contusion and bruising to left eye area.  Pt complains of localized pain to left side of face/head.         HISTORY OF PRESENT ILLNESS       Noemy Alcantar is a 89 y.o. female who presents after what sound like mechanical fall.  She has dementia and lives at a nursing care facility long-term.  Sound like she tripped and fell causing her to fall forward.  Struck her face on a hard object.  Nothing otherwise makes his symptoms better or worse.  She reports pain primarily to her face and buttocks region at the coccyx.  Denies chest pain or difficulty breathing.  Denies any extremity injury.  No other symptoms or concerns.    REVIEW OF SYSTEMS       Review of Systems   Constitutional:  Negative for chills, fatigue and fever.   HENT:  Negative for rhinorrhea and sore throat.    Eyes:  Negative for pain.   Respiratory:  Negative for cough and shortness of breath.    Cardiovascular:  Negative for chest pain.   Gastrointestinal:  Negative for abdominal pain, diarrhea, nausea and vomiting.   Genitourinary:  Negative for difficulty urinating.   Musculoskeletal:  Negative for back pain and neck pain.   Skin:  Negative for rash.   Neurological:  Negative for weakness and headaches.        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

## 2024-10-20 NOTE — DISCHARGE INSTRUCTIONS
PLEASE RETURN TO THE EMERGENCY DEPARTMENT IMMEDIATELY if your symptoms worsen in anyway or in 8-12 hours if not improved for re-evaluation.  You should immediately return to the ER for symptoms such as new or worsening pain, fever, visual or hearing changes, stiff neck, rash, a feeling of passing out, chest pain, shortness of breath, persistent nausea and/or vomiting, numbness or weakness to the arms or legs, coolness or color change of the arms or legs.      Take your medication as indicated and prescribed.  If you are given an antibiotic then, make sure you get the prescription filled and take the antibiotics until finished.      Please understand that at this time there is no evidence for a more serious underlying process, but that early in the process of an illness or injury, an emergency department workup can be falsely reassuring.  You should contact your family doctor within the next 24 hours for a follow up appointment    THANK YOU!!!    From [facility] and [facility]    On behalf of the Emergency Department staff at [facility], I would like to thank you for giving us the opportunity to address your health care needs and concerns.    We hope that during your visit, our service was delivered in a professional and caring manner. Please keep [facility] in mind as we walk with you down the path to your own personal wellness.     Please expect an automated text message or email from us so we can ask a few questions about your health and progress. Based on your answers, a clinician may call you back to offer help and instructions.    Please understand that early in the process of an illness or injury, an emergency department workup can be falsely reassuring.  If you notice any worsening, changing or persistent symptoms please call your family doctor or return to the ER immediately.     Tell us how we did during your visit at [email or web address]   and let us know about your